# Patient Record
Sex: MALE | Race: WHITE | Employment: OTHER | URBAN - METROPOLITAN AREA
[De-identification: names, ages, dates, MRNs, and addresses within clinical notes are randomized per-mention and may not be internally consistent; named-entity substitution may affect disease eponyms.]

---

## 2018-03-26 ENCOUNTER — HOSPITAL ENCOUNTER (OUTPATIENT)
Dept: PREADMISSION TESTING | Age: 48
Discharge: HOME | End: 2018-03-30
Payer: MEDICARE

## 2018-03-26 VITALS
HEART RATE: 91 BPM | BODY MASS INDEX: 28.98 KG/M2 | HEIGHT: 68 IN | OXYGEN SATURATION: 96 % | WEIGHT: 191.2 LBS | TEMPERATURE: 97.6 F | SYSTOLIC BLOOD PRESSURE: 90 MMHG | DIASTOLIC BLOOD PRESSURE: 63 MMHG | RESPIRATION RATE: 16 BRPM

## 2018-03-26 DIAGNOSIS — G56.01 CARPAL TUNNEL SYNDROME OF RIGHT WRIST: ICD-10-CM

## 2018-03-26 LAB
ANION GAP SERPL CALCULATED.3IONS-SCNC: 15 MEQ/L (ref 7–13)
BUN BLDV-MCNC: 11 MG/DL (ref 6–20)
CALCIUM SERPL-MCNC: 9 MG/DL (ref 8.6–10.2)
CHLORIDE BLD-SCNC: 101 MEQ/L (ref 98–107)
CO2: 25 MEQ/L (ref 22–29)
CREAT SERPL-MCNC: 0.81 MG/DL (ref 0.7–1.2)
EKG ATRIAL RATE: 67 BPM
EKG P AXIS: 67 DEGREES
EKG P-R INTERVAL: 162 MS
EKG Q-T INTERVAL: 380 MS
EKG QRS DURATION: 84 MS
EKG QTC CALCULATION (BAZETT): 401 MS
EKG R AXIS: 47 DEGREES
EKG T AXIS: 29 DEGREES
EKG VENTRICULAR RATE: 67 BPM
GFR AFRICAN AMERICAN: >60
GFR NON-AFRICAN AMERICAN: >60
GLUCOSE BLD-MCNC: 98 MG/DL (ref 74–109)
HCT VFR BLD CALC: 39.2 % (ref 42–52)
HEMOGLOBIN: 14.3 G/DL (ref 14–18)
MCH RBC QN AUTO: 31.1 PG (ref 27–31.3)
MCHC RBC AUTO-ENTMCNC: 36.4 % (ref 33–37)
MCV RBC AUTO: 85.5 FL (ref 80–100)
PDW BLD-RTO: 13.8 % (ref 11.5–14.5)
PLATELET # BLD: 219 K/UL (ref 130–400)
POTASSIUM SERPL-SCNC: 4.1 MEQ/L (ref 3.5–5.1)
RBC # BLD: 4.59 M/UL (ref 4.7–6.1)
SODIUM BLD-SCNC: 141 MEQ/L (ref 132–144)
WBC # BLD: 8.4 K/UL (ref 4.8–10.8)

## 2018-03-26 PROCEDURE — 80048 BASIC METABOLIC PNL TOTAL CA: CPT

## 2018-03-26 PROCEDURE — 93005 ELECTROCARDIOGRAM TRACING: CPT

## 2018-03-26 PROCEDURE — 85027 COMPLETE CBC AUTOMATED: CPT

## 2018-03-26 RX ORDER — SODIUM CHLORIDE 0.9 % (FLUSH) 0.9 %
10 SYRINGE (ML) INJECTION PRN
Status: CANCELLED | OUTPATIENT
Start: 2018-03-26

## 2018-03-26 RX ORDER — SODIUM CHLORIDE, SODIUM LACTATE, POTASSIUM CHLORIDE, CALCIUM CHLORIDE 600; 310; 30; 20 MG/100ML; MG/100ML; MG/100ML; MG/100ML
INJECTION, SOLUTION INTRAVENOUS CONTINUOUS
Status: CANCELLED | OUTPATIENT
Start: 2018-03-26

## 2018-03-26 RX ORDER — LIDOCAINE HYDROCHLORIDE 10 MG/ML
1 INJECTION, SOLUTION EPIDURAL; INFILTRATION; INTRACAUDAL; PERINEURAL
Status: CANCELLED | OUTPATIENT
Start: 2018-03-26 | End: 2018-03-26

## 2018-03-26 RX ORDER — SODIUM CHLORIDE 0.9 % (FLUSH) 0.9 %
10 SYRINGE (ML) INJECTION EVERY 12 HOURS SCHEDULED
Status: CANCELLED | OUTPATIENT
Start: 2018-03-26

## 2018-03-27 PROCEDURE — 93010 ELECTROCARDIOGRAM REPORT: CPT | Performed by: INTERNAL MEDICINE

## 2018-04-02 ENCOUNTER — ANESTHESIA EVENT (OUTPATIENT)
Dept: OPERATING ROOM | Age: 48
End: 2018-04-02
Payer: MEDICARE

## 2018-04-02 ENCOUNTER — ANESTHESIA (OUTPATIENT)
Dept: OPERATING ROOM | Age: 48
End: 2018-04-02
Payer: MEDICARE

## 2018-04-02 ENCOUNTER — HOSPITAL ENCOUNTER (OUTPATIENT)
Age: 48
Setting detail: OUTPATIENT SURGERY
Discharge: HOME | End: 2018-04-02
Attending: ORTHOPAEDIC SURGERY | Admitting: ORTHOPAEDIC SURGERY
Payer: MEDICARE

## 2018-04-02 VITALS
HEART RATE: 66 BPM | HEIGHT: 68 IN | WEIGHT: 191 LBS | TEMPERATURE: 97.5 F | RESPIRATION RATE: 16 BRPM | BODY MASS INDEX: 28.95 KG/M2 | OXYGEN SATURATION: 96 % | SYSTOLIC BLOOD PRESSURE: 146 MMHG | DIASTOLIC BLOOD PRESSURE: 74 MMHG

## 2018-04-02 VITALS — DIASTOLIC BLOOD PRESSURE: 57 MMHG | SYSTOLIC BLOOD PRESSURE: 108 MMHG | OXYGEN SATURATION: 100 % | TEMPERATURE: 95.2 F

## 2018-04-02 DIAGNOSIS — G56.01 CARPAL TUNNEL SYNDROME OF RIGHT WRIST: Primary | ICD-10-CM

## 2018-04-02 LAB
GLUCOSE BLD-MCNC: 135 MG/DL
GLUCOSE BLD-MCNC: 135 MG/DL (ref 60–115)
PERFORMED ON: ABNORMAL

## 2018-04-02 PROCEDURE — 2580000003 HC RX 258: Performed by: NURSE PRACTITIONER

## 2018-04-02 PROCEDURE — 2580000003 HC RX 258: Performed by: NURSE ANESTHETIST, CERTIFIED REGISTERED

## 2018-04-02 PROCEDURE — 3600000004 HC SURGERY LEVEL 4 BASE: Performed by: ORTHOPAEDIC SURGERY

## 2018-04-02 PROCEDURE — 2500000003 HC RX 250 WO HCPCS: Performed by: NURSE ANESTHETIST, CERTIFIED REGISTERED

## 2018-04-02 PROCEDURE — 6360000002 HC RX W HCPCS: Performed by: ORTHOPAEDIC SURGERY

## 2018-04-02 PROCEDURE — 6360000002 HC RX W HCPCS: Performed by: NURSE ANESTHETIST, CERTIFIED REGISTERED

## 2018-04-02 PROCEDURE — 3700000000 HC ANESTHESIA ATTENDED CARE: Performed by: ORTHOPAEDIC SURGERY

## 2018-04-02 PROCEDURE — 2580000003 HC RX 258: Performed by: ORTHOPAEDIC SURGERY

## 2018-04-02 PROCEDURE — 2500000003 HC RX 250 WO HCPCS: Performed by: ORTHOPAEDIC SURGERY

## 2018-04-02 PROCEDURE — 3600000014 HC SURGERY LEVEL 4 ADDTL 15MIN: Performed by: ORTHOPAEDIC SURGERY

## 2018-04-02 PROCEDURE — 3700000001 HC ADD 15 MINUTES (ANESTHESIA): Performed by: ORTHOPAEDIC SURGERY

## 2018-04-02 PROCEDURE — 7100000010 HC PHASE II RECOVERY - FIRST 15 MIN: Performed by: ORTHOPAEDIC SURGERY

## 2018-04-02 PROCEDURE — 7100000011 HC PHASE II RECOVERY - ADDTL 15 MIN: Performed by: ORTHOPAEDIC SURGERY

## 2018-04-02 PROCEDURE — 2500000003 HC RX 250 WO HCPCS: Performed by: NURSE PRACTITIONER

## 2018-04-02 RX ORDER — SODIUM CHLORIDE 0.9 % (FLUSH) 0.9 %
10 SYRINGE (ML) INJECTION PRN
Status: DISCONTINUED | OUTPATIENT
Start: 2018-04-02 | End: 2018-04-02 | Stop reason: HOSPADM

## 2018-04-02 RX ORDER — MEPERIDINE HYDROCHLORIDE 25 MG/ML
12.5 INJECTION INTRAMUSCULAR; INTRAVENOUS; SUBCUTANEOUS EVERY 5 MIN PRN
Status: DISCONTINUED | OUTPATIENT
Start: 2018-04-02 | End: 2018-04-02 | Stop reason: HOSPADM

## 2018-04-02 RX ORDER — HYDROCODONE BITARTRATE AND ACETAMINOPHEN 5; 325 MG/1; MG/1
1 TABLET ORAL EVERY 6 HOURS PRN
Qty: 15 TABLET | Refills: 0 | Status: SHIPPED | OUTPATIENT
Start: 2018-04-02 | End: 2018-04-07

## 2018-04-02 RX ORDER — SODIUM CHLORIDE 0.9 % (FLUSH) 0.9 %
10 SYRINGE (ML) INJECTION EVERY 12 HOURS SCHEDULED
Status: DISCONTINUED | OUTPATIENT
Start: 2018-04-02 | End: 2018-04-02 | Stop reason: HOSPADM

## 2018-04-02 RX ORDER — LIDOCAINE HYDROCHLORIDE 10 MG/ML
1 INJECTION, SOLUTION EPIDURAL; INFILTRATION; INTRACAUDAL; PERINEURAL
Status: COMPLETED | OUTPATIENT
Start: 2018-04-02 | End: 2018-04-02

## 2018-04-02 RX ORDER — LIDOCAINE HYDROCHLORIDE 10 MG/ML
INJECTION, SOLUTION EPIDURAL; INFILTRATION; INTRACAUDAL; PERINEURAL PRN
Status: DISCONTINUED | OUTPATIENT
Start: 2018-04-02 | End: 2018-04-02 | Stop reason: HOSPADM

## 2018-04-02 RX ORDER — HYDROCODONE BITARTRATE AND ACETAMINOPHEN 5; 325 MG/1; MG/1
1 TABLET ORAL PRN
Status: DISCONTINUED | OUTPATIENT
Start: 2018-04-02 | End: 2018-04-02 | Stop reason: HOSPADM

## 2018-04-02 RX ORDER — SODIUM CHLORIDE, SODIUM LACTATE, POTASSIUM CHLORIDE, CALCIUM CHLORIDE 600; 310; 30; 20 MG/100ML; MG/100ML; MG/100ML; MG/100ML
INJECTION, SOLUTION INTRAVENOUS CONTINUOUS PRN
Status: DISCONTINUED | OUTPATIENT
Start: 2018-04-02 | End: 2018-04-02 | Stop reason: SDUPTHER

## 2018-04-02 RX ORDER — LIDOCAINE HYDROCHLORIDE 20 MG/ML
INJECTION, SOLUTION INFILTRATION; PERINEURAL PRN
Status: DISCONTINUED | OUTPATIENT
Start: 2018-04-02 | End: 2018-04-02 | Stop reason: SDUPTHER

## 2018-04-02 RX ORDER — ONDANSETRON 2 MG/ML
4 INJECTION INTRAMUSCULAR; INTRAVENOUS
Status: DISCONTINUED | OUTPATIENT
Start: 2018-04-02 | End: 2018-04-02 | Stop reason: HOSPADM

## 2018-04-02 RX ORDER — MAGNESIUM HYDROXIDE 1200 MG/15ML
LIQUID ORAL CONTINUOUS PRN
Status: DISCONTINUED | OUTPATIENT
Start: 2018-04-02 | End: 2018-04-02 | Stop reason: HOSPADM

## 2018-04-02 RX ORDER — PROPOFOL 10 MG/ML
INJECTION, EMULSION INTRAVENOUS CONTINUOUS PRN
Status: DISCONTINUED | OUTPATIENT
Start: 2018-04-02 | End: 2018-04-02 | Stop reason: SDUPTHER

## 2018-04-02 RX ORDER — HYDROCODONE BITARTRATE AND ACETAMINOPHEN 5; 325 MG/1; MG/1
2 TABLET ORAL PRN
Status: DISCONTINUED | OUTPATIENT
Start: 2018-04-02 | End: 2018-04-02 | Stop reason: HOSPADM

## 2018-04-02 RX ORDER — PROPOFOL 10 MG/ML
INJECTION, EMULSION INTRAVENOUS PRN
Status: DISCONTINUED | OUTPATIENT
Start: 2018-04-02 | End: 2018-04-02 | Stop reason: SDUPTHER

## 2018-04-02 RX ORDER — FENTANYL CITRATE 50 UG/ML
50 INJECTION, SOLUTION INTRAMUSCULAR; INTRAVENOUS EVERY 10 MIN PRN
Status: DISCONTINUED | OUTPATIENT
Start: 2018-04-02 | End: 2018-04-02 | Stop reason: HOSPADM

## 2018-04-02 RX ORDER — DIPHENHYDRAMINE HYDROCHLORIDE 50 MG/ML
12.5 INJECTION INTRAMUSCULAR; INTRAVENOUS
Status: DISCONTINUED | OUTPATIENT
Start: 2018-04-02 | End: 2018-04-02 | Stop reason: HOSPADM

## 2018-04-02 RX ORDER — MIDAZOLAM HYDROCHLORIDE 1 MG/ML
INJECTION INTRAMUSCULAR; INTRAVENOUS PRN
Status: DISCONTINUED | OUTPATIENT
Start: 2018-04-02 | End: 2018-04-02 | Stop reason: SDUPTHER

## 2018-04-02 RX ORDER — SODIUM CHLORIDE, SODIUM LACTATE, POTASSIUM CHLORIDE, CALCIUM CHLORIDE 600; 310; 30; 20 MG/100ML; MG/100ML; MG/100ML; MG/100ML
INJECTION, SOLUTION INTRAVENOUS CONTINUOUS
Status: DISCONTINUED | OUTPATIENT
Start: 2018-04-02 | End: 2018-04-02 | Stop reason: HOSPADM

## 2018-04-02 RX ORDER — METOCLOPRAMIDE HYDROCHLORIDE 5 MG/ML
10 INJECTION INTRAMUSCULAR; INTRAVENOUS
Status: DISCONTINUED | OUTPATIENT
Start: 2018-04-02 | End: 2018-04-02 | Stop reason: HOSPADM

## 2018-04-02 RX ADMIN — CEFAZOLIN SODIUM 2 G: 2 SOLUTION INTRAVENOUS at 07:30

## 2018-04-02 RX ADMIN — SODIUM CHLORIDE, POTASSIUM CHLORIDE, SODIUM LACTATE AND CALCIUM CHLORIDE: 600; 310; 30; 20 INJECTION, SOLUTION INTRAVENOUS at 07:20

## 2018-04-02 RX ADMIN — MIDAZOLAM HYDROCHLORIDE 1 MG: 1 INJECTION, SOLUTION INTRAMUSCULAR; INTRAVENOUS at 07:43

## 2018-04-02 RX ADMIN — MIDAZOLAM HYDROCHLORIDE 1 MG: 1 INJECTION, SOLUTION INTRAMUSCULAR; INTRAVENOUS at 07:35

## 2018-04-02 RX ADMIN — PROPOFOL 30 MG: 10 INJECTION, EMULSION INTRAVENOUS at 07:43

## 2018-04-02 RX ADMIN — PROPOFOL 30 MG: 10 INJECTION, EMULSION INTRAVENOUS at 07:35

## 2018-04-02 RX ADMIN — PROPOFOL 30 MG: 10 INJECTION, EMULSION INTRAVENOUS at 07:37

## 2018-04-02 RX ADMIN — SODIUM CHLORIDE, POTASSIUM CHLORIDE, SODIUM LACTATE AND CALCIUM CHLORIDE 1000 ML: 600; 310; 30; 20 INJECTION, SOLUTION INTRAVENOUS at 06:30

## 2018-04-02 RX ADMIN — PROPOFOL 75 MCG/KG/MIN: 10 INJECTION, EMULSION INTRAVENOUS at 07:35

## 2018-04-02 RX ADMIN — MIDAZOLAM HYDROCHLORIDE 2 MG: 1 INJECTION, SOLUTION INTRAMUSCULAR; INTRAVENOUS at 07:27

## 2018-04-02 RX ADMIN — LIDOCAINE HYDROCHLORIDE 0.1 ML: 10 INJECTION, SOLUTION EPIDURAL; INFILTRATION; INTRACAUDAL; PERINEURAL at 06:30

## 2018-04-02 RX ADMIN — LIDOCAINE HYDROCHLORIDE 100 MG: 20 INJECTION, SOLUTION INFILTRATION; PERINEURAL at 07:35

## 2018-04-02 ASSESSMENT — PULMONARY FUNCTION TESTS
PIF_VALUE: 0
PIF_VALUE: 1
PIF_VALUE: 1
PIF_VALUE: 0
PIF_VALUE: 1
PIF_VALUE: 0
PIF_VALUE: 1
PIF_VALUE: 0
PIF_VALUE: 0
PIF_VALUE: 1
PIF_VALUE: 1
PIF_VALUE: 0
PIF_VALUE: 0
PIF_VALUE: 1
PIF_VALUE: 0
PIF_VALUE: 1
PIF_VALUE: 0
PIF_VALUE: 0
PIF_VALUE: 1
PIF_VALUE: 0
PIF_VALUE: 1
PIF_VALUE: 1

## 2018-04-02 ASSESSMENT — PAIN - FUNCTIONAL ASSESSMENT: PAIN_FUNCTIONAL_ASSESSMENT: 0-10

## 2018-04-02 ASSESSMENT — PAIN SCALES - GENERAL: PAINLEVEL_OUTOF10: 0

## 2018-04-02 ASSESSMENT — PAIN DESCRIPTION - LOCATION: LOCATION: HAND

## 2018-04-02 ASSESSMENT — LIFESTYLE VARIABLES: SMOKING_STATUS: 1

## 2018-04-02 ASSESSMENT — PAIN DESCRIPTION - ORIENTATION: ORIENTATION: RIGHT

## 2020-11-11 ENCOUNTER — HOSPITAL ENCOUNTER (OUTPATIENT)
Dept: MRI IMAGING | Age: 50
Discharge: HOME | End: 2020-11-13
Payer: MEDICARE

## 2020-11-11 PROCEDURE — 72141 MRI NECK SPINE W/O DYE: CPT

## 2023-01-24 PROBLEM — K61.1 PERIRECTAL ABSCESS: Status: ACTIVE | Noted: 2023-01-24

## 2023-01-24 PROBLEM — K59.00 CONSTIPATION: Status: ACTIVE | Noted: 2023-01-24

## 2023-01-24 PROBLEM — F41.9 ANXIETY: Status: ACTIVE | Noted: 2023-01-24

## 2023-01-24 PROBLEM — K21.9 GERD (GASTROESOPHAGEAL REFLUX DISEASE): Status: ACTIVE | Noted: 2023-01-24

## 2023-01-24 PROBLEM — K61.0 ABSCESS, PERIANAL: Status: ACTIVE | Noted: 2023-01-24

## 2023-01-24 PROBLEM — I10 HTN (HYPERTENSION): Status: ACTIVE | Noted: 2023-01-24

## 2023-01-24 PROBLEM — M50.90 CERVICAL DISC DISEASE: Status: ACTIVE | Noted: 2023-01-24

## 2023-01-24 PROBLEM — R20.2 PARESTHESIA OF UPPER EXTREMITY: Status: ACTIVE | Noted: 2023-01-24

## 2023-01-24 PROBLEM — J20.9 ACUTE BRONCHITIS: Status: ACTIVE | Noted: 2023-01-24

## 2023-01-24 PROBLEM — M48.02 DEGENERATIVE CERVICAL SPINAL STENOSIS: Status: ACTIVE | Noted: 2023-01-24

## 2023-01-24 PROBLEM — G89.29 CHRONIC PAIN OF BOTH SHOULDERS: Status: ACTIVE | Noted: 2023-01-24

## 2023-01-24 PROBLEM — H10.32 ACUTE BACTERIAL CONJUNCTIVITIS OF LEFT EYE: Status: ACTIVE | Noted: 2023-01-24

## 2023-01-24 PROBLEM — M48.02 NEURAL FORAMINAL STENOSIS OF CERVICAL SPINE: Status: ACTIVE | Noted: 2023-01-24

## 2023-01-24 PROBLEM — M79.10 MYALGIA: Status: ACTIVE | Noted: 2023-01-24

## 2023-01-24 PROBLEM — M54.2 NECK PAIN: Status: ACTIVE | Noted: 2023-01-24

## 2023-01-24 PROBLEM — E11.40 DIABETIC NEUROPATHY (MULTI): Status: ACTIVE | Noted: 2023-01-24

## 2023-01-24 PROBLEM — R53.83 FATIGUE: Status: ACTIVE | Noted: 2023-01-24

## 2023-01-24 PROBLEM — K62.89 RECTAL PAIN: Status: ACTIVE | Noted: 2023-01-24

## 2023-01-24 PROBLEM — M25.512 LEFT SHOULDER PAIN: Status: ACTIVE | Noted: 2023-01-24

## 2023-01-24 PROBLEM — R82.2 BILIRUBINURIA: Status: ACTIVE | Noted: 2023-01-24

## 2023-01-24 PROBLEM — N52.9 ERECTILE DYSFUNCTION: Status: ACTIVE | Noted: 2023-01-24

## 2023-01-24 PROBLEM — G56.00 CARPAL TUNNEL SYNDROME: Status: ACTIVE | Noted: 2023-01-24

## 2023-01-24 PROBLEM — R41.3 MEMORY DEFICITS: Status: ACTIVE | Noted: 2023-01-24

## 2023-01-24 PROBLEM — E55.9 VITAMIN D DEFICIENCY: Status: ACTIVE | Noted: 2023-01-24

## 2023-01-24 PROBLEM — K80.20 GALLSTONES: Status: ACTIVE | Noted: 2023-01-24

## 2023-01-24 PROBLEM — R10.13 ABDOMINAL PAIN, EPIGASTRIC: Status: ACTIVE | Noted: 2023-01-24

## 2023-01-24 PROBLEM — M54.12 CERVICAL RADICULOPATHY, CHRONIC: Status: ACTIVE | Noted: 2023-01-24

## 2023-01-24 PROBLEM — L02.416 ABSCESS OF KNEE, LEFT: Status: ACTIVE | Noted: 2023-01-24

## 2023-01-24 PROBLEM — L03.119 CELLULITIS OF KNEE: Status: ACTIVE | Noted: 2023-01-24

## 2023-01-24 PROBLEM — C62.90 MALIGNANT NEOPLASM OF TESTIS (MULTI): Status: ACTIVE | Noted: 2023-01-24

## 2023-01-24 PROBLEM — H40.059 INCREASED INTRAOCULAR PRESSURE: Status: ACTIVE | Noted: 2023-01-24

## 2023-01-24 PROBLEM — D14.1 BENIGN NEOPLASM OF LARYNX: Status: ACTIVE | Noted: 2023-01-24

## 2023-01-24 PROBLEM — R10.9 ABDOMINAL PAIN OF MULTIPLE SITES: Status: ACTIVE | Noted: 2023-01-24

## 2023-01-24 PROBLEM — R42 VERTIGO: Status: ACTIVE | Noted: 2023-01-24

## 2023-01-24 PROBLEM — R29.898 ARM WEAKNESS: Status: ACTIVE | Noted: 2023-01-24

## 2023-01-24 PROBLEM — R10.11 RIGHT UPPER QUADRANT ABDOMINAL PAIN: Status: ACTIVE | Noted: 2023-01-24

## 2023-01-24 PROBLEM — E66.3 OVERWEIGHT: Status: ACTIVE | Noted: 2023-01-24

## 2023-01-24 PROBLEM — M25.512 CHRONIC PAIN OF BOTH SHOULDERS: Status: ACTIVE | Noted: 2023-01-24

## 2023-01-24 PROBLEM — R20.0 NUMBNESS: Status: ACTIVE | Noted: 2023-01-24

## 2023-01-24 PROBLEM — K92.1 BLOOD IN STOOL: Status: ACTIVE | Noted: 2023-01-24

## 2023-01-24 PROBLEM — E78.5 HYPERLIPIDEMIA: Status: ACTIVE | Noted: 2023-01-24

## 2023-01-24 PROBLEM — R11.2 NAUSEA AND VOMITING: Status: ACTIVE | Noted: 2023-01-24

## 2023-01-24 PROBLEM — K62.5 RECTAL BLEEDING: Status: ACTIVE | Noted: 2023-01-24

## 2023-01-24 PROBLEM — M25.511 CHRONIC PAIN OF BOTH SHOULDERS: Status: ACTIVE | Noted: 2023-01-24

## 2023-01-24 PROBLEM — E78.1 HYPERTRIGLYCERIDEMIA: Status: ACTIVE | Noted: 2023-01-24

## 2023-01-24 PROBLEM — E11.9 TYPE 2 DIABETES MELLITUS (MULTI): Status: ACTIVE | Noted: 2023-01-24

## 2023-01-24 PROBLEM — E29.1 HYPOGONADISM IN MALE: Status: ACTIVE | Noted: 2023-01-24

## 2023-01-24 PROBLEM — E53.8 VITAMIN B 12 DEFICIENCY: Status: ACTIVE | Noted: 2023-01-24

## 2023-01-24 PROBLEM — F17.210 CIGARETTE SMOKER: Status: ACTIVE | Noted: 2023-01-24

## 2023-01-24 RX ORDER — TADALAFIL 5 MG/1
1 TABLET ORAL DAILY
COMMUNITY
Start: 2021-05-17 | End: 2024-02-19 | Stop reason: SDUPTHER

## 2023-01-24 RX ORDER — OMEPRAZOLE 40 MG/1
1 CAPSULE, DELAYED RELEASE ORAL DAILY
COMMUNITY
Start: 2021-05-17 | End: 2024-02-12 | Stop reason: SDUPTHER

## 2023-01-24 RX ORDER — FAMOTIDINE 20 MG/1
1 TABLET, FILM COATED ORAL 2 TIMES DAILY PRN
COMMUNITY
Start: 2021-05-17 | End: 2024-02-12 | Stop reason: SDUPTHER

## 2023-01-24 RX ORDER — SYRINGE WITH NEEDLE, 1 ML 25GX5/8"
SYRINGE, EMPTY DISPOSABLE MISCELLANEOUS SEE ADMIN INSTRUCTIONS
COMMUNITY

## 2023-01-24 RX ORDER — ONDANSETRON 4 MG/1
1 TABLET, ORALLY DISINTEGRATING ORAL SEE ADMIN INSTRUCTIONS
COMMUNITY
Start: 2017-06-07

## 2023-01-24 RX ORDER — CYANOCOBALAMIN 1000 UG/ML
1 INJECTION, SOLUTION INTRAMUSCULAR; SUBCUTANEOUS SEE ADMIN INSTRUCTIONS
COMMUNITY
Start: 2022-09-08

## 2023-01-24 RX ORDER — ASPIRIN 325 MG
1 TABLET, DELAYED RELEASE (ENTERIC COATED) ORAL SEE ADMIN INSTRUCTIONS
COMMUNITY
Start: 2021-09-23

## 2023-03-09 ENCOUNTER — OFFICE VISIT (OUTPATIENT)
Dept: PRIMARY CARE | Facility: CLINIC | Age: 53
End: 2023-03-09
Payer: MEDICARE

## 2023-03-09 VITALS
HEART RATE: 69 BPM | DIASTOLIC BLOOD PRESSURE: 75 MMHG | OXYGEN SATURATION: 97 % | BODY MASS INDEX: 29.8 KG/M2 | SYSTOLIC BLOOD PRESSURE: 126 MMHG | HEIGHT: 68 IN | WEIGHT: 196.6 LBS

## 2023-03-09 DIAGNOSIS — Z12.11 SCREEN FOR COLON CANCER: ICD-10-CM

## 2023-03-09 DIAGNOSIS — F17.210 CIGARETTE NICOTINE DEPENDENCE WITHOUT COMPLICATION: ICD-10-CM

## 2023-03-09 DIAGNOSIS — G89.29 CHRONIC LOW BACK PAIN, UNSPECIFIED BACK PAIN LATERALITY, UNSPECIFIED WHETHER SCIATICA PRESENT: ICD-10-CM

## 2023-03-09 DIAGNOSIS — M54.50 CHRONIC LOW BACK PAIN, UNSPECIFIED BACK PAIN LATERALITY, UNSPECIFIED WHETHER SCIATICA PRESENT: ICD-10-CM

## 2023-03-09 DIAGNOSIS — R06.02 SOB (SHORTNESS OF BREATH): ICD-10-CM

## 2023-03-09 DIAGNOSIS — R06.2 WHEEZING: ICD-10-CM

## 2023-03-09 DIAGNOSIS — Z00.00 PREVENTATIVE HEALTH CARE: Primary | ICD-10-CM

## 2023-03-09 DIAGNOSIS — K62.5 RECTAL BLEEDING: ICD-10-CM

## 2023-03-09 PROCEDURE — 3078F DIAST BP <80 MM HG: CPT | Performed by: FAMILY MEDICINE

## 2023-03-09 PROCEDURE — 3074F SYST BP LT 130 MM HG: CPT | Performed by: FAMILY MEDICINE

## 2023-03-09 PROCEDURE — 99215 OFFICE O/P EST HI 40 MIN: CPT | Performed by: FAMILY MEDICINE

## 2023-03-09 PROCEDURE — 99396 PREV VISIT EST AGE 40-64: CPT | Performed by: FAMILY MEDICINE

## 2023-03-09 RX ORDER — ALBUTEROL SULFATE 90 UG/1
2 AEROSOL, METERED RESPIRATORY (INHALATION) EVERY 4 HOURS PRN
Qty: 8 G | Refills: 5 | Status: SHIPPED | OUTPATIENT
Start: 2023-03-09 | End: 2024-03-08

## 2023-03-09 RX ORDER — CELECOXIB 100 MG/1
CAPSULE ORAL
Qty: 60 CAPSULE | Refills: 1 | Status: SHIPPED | OUTPATIENT
Start: 2023-03-09 | End: 2023-06-09

## 2023-03-09 RX ORDER — CYANOCOBALAMIN 1000 UG/ML
1000 INJECTION, SOLUTION INTRAMUSCULAR; SUBCUTANEOUS ONCE
Status: CANCELLED | OUTPATIENT
Start: 2023-03-09 | End: 2023-03-09

## 2023-03-09 RX ORDER — VARENICLINE TARTRATE 1 MG/1
1 TABLET, FILM COATED ORAL 2 TIMES DAILY
Qty: 60 TABLET | Refills: 11 | Status: SHIPPED | OUTPATIENT
Start: 2023-03-09 | End: 2024-02-19 | Stop reason: ALTCHOICE

## 2023-03-09 NOTE — PROGRESS NOTES
Pt in today for routine FU. / C/o back pain. Also preventative.    Denies N/V/D/C, no HA/S/V, denies rashes/lesions, no CP/SOB. Denies fevers/chills.  All other systems were negative.    Gen: NAD  eyes: EOMI, PERRLA  ENT: hearing grossly intact, no nasal discharge  resp: CTABL, without R/R  heart: RRR without MRG  GI: abd: S/ND/NT, BS+  lymph: no axillary, cervical, supraclavicular lymphadenopathy noted   MS: gait grossly WNL,  derm: no rashes or lesions noted  neuro: CN II-XII grossly intact  psych: A&Ox3    Adding B12 injection every 2 weeks.      Preventative visit and [facility] [place] due next in 2023.  ---------  Screening for colon cancer. -->> referring to gi    Screen for hepatitis C was negative September 2022.    Screening for prostate cancer.  PSA 0.97, last time was 1.0.  Patient does have family history of prostate cancer. -->> We will plan to check PSA annually.    Patient is due for annual flu shot.  Is up to date on coronavirus immunization, due for bivalent booster.  Is also due for shingles immunizations.  Had Tdap immunization in September 2021. -->>  Check with your pharmacy about getting up-to-date on your other immunizations.  ------  Cardiac CT calcium scoring was zero, over read was also negative.    Overweight, bmi 29.  Up 6 since last appt here. Down a lot from a max of 390. -->>  Keep up the excellent overall work.  Continue to avoid simple carbohydrates like bread, pasta, potatoes, rice, sugars etc.    Regarding previous labs:  - Type 2 diabetes, with diabetic neuropathy, A1c 7.3, was 7.2, 6.5, 6.5, 6.4, 6.0, 6.2, 6.5, 6.5, 6.3, 6.9 (oldest). H/O A1c's in the 14s. Had very severe xanthomatosis this was around the year 2000. Put on diabetic meds on 2001, put on weight until approximately doubling his weight maxing out at approximately 393. Around 2014 stopped taking his diabetic meds, has been losing weight since then.  Denies family history of thyroid cancers.  Was started on Ozempic at last  appointment.  Has taken it 3 times and every time had severe abdominal pain, similar to previous pancreatitis episodes. -->>  We will discontinue Ozempic.  We will try the oral version which is Rybelsus.  We will just start on the low-dose.  We will just keep on the 3 mg for the next 3 months.  - Vitamin D deficiency, 10, was 9, 8, 11,  20, 10, 9, 7, 16. Your goal is 75 - 100 or more.  Take about 50,000 units monthly. -->>  Try to increase to 4 pills a month and will recheck in 3 months or so.    -Hyperlipidemia, HDL 30, was 32, 36, 35, goal 45 or more. LDL 96, was N/C, 116, 93, goal is 70 or less. , was 477, was 168, 740. . Historically as high as 13,000, averaging 5000s. Patient was taking Crestor 5 mg at that time, is not anymore due to gastrointestinal side effects.  Has failed gemfibrozil also due to GI side effects-->> we had discussed possibly restarting Crestor, but on second thought with his triglyceride history we should probably be able to work on getting one of the newer injectables.  We will recheck lipids in 3 months and discuss again then.  - Drug therapy, screening for conditions.  -->>  Recheck annual labs around September 2023.  - Hypogonadism, elevated estrogens, DHEA sulfate is borderline low. Testosterone is probably not optiaml, this is less than optimal.    b12 deficiency, is noticing some extra energy with the B12 shots. -->>  Can continue B12 injections every week or 2.      Low back pain.  Been bad for the last month or so.  Has tried Aleve over-the-counter, helping little. -->>  We will prescribe Celebrex and refer to orthopedics for evaluation and treatment.    Shortness of breath with wheezing.  Has had this for several months, worsening over time. -->> Prescribed albuterol inhaler and refer to pulmonary medicine for evaluation and treatment.    Smoking about 2 packs/day.  Is feeling like he is really ready to quit.  Has had success with Chantix in the past. -->>  We will prescribe  Chantix.    -We will draw labs today that were previously ordered, that would be lipid panel, vitamin D, A1c.    - We will administer B12 injection today and can schedule patient every 2 weeks for B12 injections.  -We will schedule virtual appointment in about a week to go over labs.

## 2023-03-16 ENCOUNTER — TELEPHONE (OUTPATIENT)
Dept: PRIMARY CARE | Facility: CLINIC | Age: 53
End: 2023-03-16

## 2023-03-17 ENCOUNTER — TELEPHONE (OUTPATIENT)
Dept: PRIMARY CARE | Facility: CLINIC | Age: 53
End: 2023-03-17
Payer: MEDICARE

## 2023-03-20 NOTE — TELEPHONE ENCOUNTER
Please contact [facility] to ask them to send us his lab results.  Thank you.  Unless of course this is already done.  Thank you also.

## 2023-03-23 ENCOUNTER — CLINICAL SUPPORT (OUTPATIENT)
Dept: PRIMARY CARE | Facility: CLINIC | Age: 53
End: 2023-03-23
Payer: MEDICARE

## 2023-03-23 DIAGNOSIS — E53.8 VITAMIN B 12 DEFICIENCY: ICD-10-CM

## 2023-03-23 PROCEDURE — 96372 THER/PROPH/DIAG INJ SC/IM: CPT | Performed by: FAMILY MEDICINE

## 2023-03-23 RX ORDER — CYANOCOBALAMIN 1000 UG/ML
1000 INJECTION, SOLUTION INTRAMUSCULAR; SUBCUTANEOUS ONCE
Status: COMPLETED | OUTPATIENT
Start: 2023-03-23 | End: 2023-03-23

## 2023-03-23 RX ADMIN — CYANOCOBALAMIN 1000 MCG: 1000 INJECTION, SOLUTION INTRAMUSCULAR; SUBCUTANEOUS at 09:09

## 2023-03-24 ENCOUNTER — TELEMEDICINE (OUTPATIENT)
Dept: PRIMARY CARE | Facility: CLINIC | Age: 53
End: 2023-03-24
Payer: MEDICARE

## 2023-03-24 DIAGNOSIS — F41.9 ANXIETY AND DEPRESSION: ICD-10-CM

## 2023-03-24 DIAGNOSIS — E11.42 TYPE 2 DIABETES MELLITUS WITH DIABETIC POLYNEUROPATHY, WITHOUT LONG-TERM CURRENT USE OF INSULIN (MULTI): Primary | ICD-10-CM

## 2023-03-24 DIAGNOSIS — E11.42 DIABETIC POLYNEUROPATHY ASSOCIATED WITH TYPE 2 DIABETES MELLITUS (MULTI): ICD-10-CM

## 2023-03-24 DIAGNOSIS — E78.5 HYPERLIPIDEMIA, UNSPECIFIED HYPERLIPIDEMIA TYPE: ICD-10-CM

## 2023-03-24 DIAGNOSIS — I10 HYPERTENSION, UNSPECIFIED TYPE: ICD-10-CM

## 2023-03-24 DIAGNOSIS — E55.9 VITAMIN D DEFICIENCY: ICD-10-CM

## 2023-03-24 DIAGNOSIS — E78.1 HYPERTRIGLYCERIDEMIA: ICD-10-CM

## 2023-03-24 DIAGNOSIS — F32.A ANXIETY AND DEPRESSION: ICD-10-CM

## 2023-03-24 PROBLEM — J20.9 ACUTE BRONCHITIS: Status: RESOLVED | Noted: 2023-01-24 | Resolved: 2023-03-24

## 2023-03-24 PROCEDURE — 99214 OFFICE O/P EST MOD 30 MIN: CPT | Performed by: FAMILY MEDICINE

## 2023-03-24 RX ORDER — DAPAGLIFLOZIN 5 MG/1
5 TABLET, FILM COATED ORAL DAILY
Qty: 30 TABLET | Refills: 3 | Status: SHIPPED | OUTPATIENT
Start: 2023-03-24 | End: 2023-08-09

## 2023-03-24 RX ORDER — BUSPIRONE HYDROCHLORIDE 10 MG/1
TABLET ORAL
Qty: 60 TABLET | Refills: 1 | Status: SHIPPED | OUTPATIENT
Start: 2023-03-24 | End: 2023-06-09

## 2023-03-24 NOTE — PROGRESS NOTES
Pt being seen virtually for his lab review FU.    Patient Discussion/Summary     New labs reviewed:  - Type 2 diabetes, with diabetic neuropathy, A1c 8.2, was 7.3, 7.2, 6.5, 6.5, 6.4, 6.0, 6.2, 6.5, 6.5, 6.3, 6.9 (oldest). H/O A1c's in the 14s. Had very severe xanthomatosis this was around the year 2000. Put on diabetic meds on 2001, put on weight until approximately doubling his weight maxing out at approximately 393. Around 2014 stopped taking his diabetic meds, has been losing weight since then.  Is now on over Rybelsus but only 3 mg every other day.  Taking it daily causes abdominal pain similar to what he was having with the Ozempic.  The pain is similar to previous pancreatitis episodes. -->>  Continue the Rybelsus every other day.  Once a month try to take it 2 days in a row to see if you are you are tolerating enough of it to be able to go to that higher dose.  - Vitamin D deficiency, 10, was 10, 9, 8, 11, 20, 10, 9, 7, 16. Your goal is 75 - 100 or more.  Is on about 50,000 units once a week.  Has the pills he got from [employer]. -->>  Increase to 2 pills a week and will recheck in 3 months.     -Hyperlipidemia, HDL 26, was 30, 32, 36, 35, goal 45 or more. LDL N/C, was 96, N/C, 116, 93, goal is 70 or less. , was 341, 477, 168, 740. Historically as high as 13,000, averaging 5000s. Patient was taking Crestor 5 mg at that time, is not anymore due to gastrointestinal side effects. Has failed gemfibrozil also due to GI side effects-->> we had discussed possibly restarting Crestor, but on second thought with his triglyceride history we should probably be able to work on getting one of the newer injectables.  Will order the free sample of the Leqvio.  We will work on getting coverage.        Regarding previous labs:  - Drug therapy, screening for conditions. -->> Recheck annual labs around September 2023.  - Hypogonadism, elevated estrogens, DHEA sulfate is borderline low. Testosterone is probably not optiaml,  this is less than optimal.     Depression with anxiety.  Anxiety is the main problem at this time.  Has been reviewing cognitive distortions. -->>  Remember to try meditation/visualization at least 5 minutes every day.  We will prescribe BuSpar trial.  If after 3 weeks or more you would like the dose increased call us and I could send in 90 days of the next higher dose.  If its not working or you have any problems call us and I will instead prescribe a trial of S-Citalopram.        - We will return in about 3 months for lab review.  - Patient will come in about a week before the plan to get fasting lipid panel, vitamin D, A1c.

## 2023-04-17 ENCOUNTER — INITIAL CONSULT (OUTPATIENT)
Dept: PAIN MANAGEMENT | Age: 53
End: 2023-04-17
Payer: MEDICARE

## 2023-04-17 VITALS
OXYGEN SATURATION: 96 % | HEIGHT: 68 IN | TEMPERATURE: 97.4 F | WEIGHT: 199 LBS | DIASTOLIC BLOOD PRESSURE: 68 MMHG | BODY MASS INDEX: 30.16 KG/M2 | HEART RATE: 98 BPM | SYSTOLIC BLOOD PRESSURE: 112 MMHG

## 2023-04-17 DIAGNOSIS — M16.12 UNILATERAL PRIMARY OSTEOARTHRITIS, LEFT HIP: Primary | ICD-10-CM

## 2023-04-17 DIAGNOSIS — M46.1 BILATERAL SACROILIITIS (HCC): ICD-10-CM

## 2023-04-17 PROCEDURE — G8419 CALC BMI OUT NRM PARAM NOF/U: HCPCS | Performed by: PAIN MEDICINE

## 2023-04-17 PROCEDURE — 99203 OFFICE O/P NEW LOW 30 MIN: CPT | Performed by: PAIN MEDICINE

## 2023-04-17 PROCEDURE — 4004F PT TOBACCO SCREEN RCVD TLK: CPT | Performed by: PAIN MEDICINE

## 2023-04-17 PROCEDURE — 3017F COLORECTAL CA SCREEN DOC REV: CPT | Performed by: PAIN MEDICINE

## 2023-04-17 PROCEDURE — G8427 DOCREV CUR MEDS BY ELIG CLIN: HCPCS | Performed by: PAIN MEDICINE

## 2023-04-17 RX ORDER — ALBUTEROL SULFATE 90 UG/1
2 AEROSOL, METERED RESPIRATORY (INHALATION) EVERY 4 HOURS PRN
COMMUNITY
Start: 2023-03-09 | End: 2024-03-08

## 2023-04-17 RX ORDER — CYANOCOBALAMIN 1000 UG/ML
1000 INJECTION, SOLUTION INTRAMUSCULAR; SUBCUTANEOUS SEE ADMIN INSTRUCTIONS
COMMUNITY
Start: 2022-09-08

## 2023-04-17 RX ORDER — ONDANSETRON 4 MG/1
TABLET, ORALLY DISINTEGRATING ORAL
COMMUNITY
Start: 2023-04-14

## 2023-04-17 RX ORDER — CYCLOBENZAPRINE HCL 10 MG
10 TABLET ORAL 2 TIMES DAILY PRN
COMMUNITY
Start: 2023-03-15

## 2023-04-17 RX ORDER — FAMOTIDINE 20 MG/1
20 TABLET, FILM COATED ORAL 2 TIMES DAILY PRN
COMMUNITY
Start: 2021-05-17 | End: 2023-04-17 | Stop reason: ALTCHOICE

## 2023-04-17 RX ORDER — CELECOXIB 100 MG/1
CAPSULE ORAL
COMMUNITY
Start: 2023-03-09

## 2023-04-17 RX ORDER — BUSPIRONE HYDROCHLORIDE 10 MG/1
TABLET ORAL
COMMUNITY
Start: 2023-03-24

## 2023-04-17 RX ORDER — ORAL SEMAGLUTIDE 3 MG/1
TABLET ORAL
COMMUNITY
Start: 2023-03-15

## 2023-04-17 RX ORDER — VARENICLINE TARTRATE 1 MG/1
TABLET, FILM COATED ORAL
COMMUNITY
Start: 2023-03-13

## 2023-04-17 RX ORDER — DAPAGLIFLOZIN 5 MG/1
5 TABLET, FILM COATED ORAL DAILY
COMMUNITY
Start: 2023-03-24 | End: 2023-04-17 | Stop reason: ALTCHOICE

## 2023-04-17 ASSESSMENT — ENCOUNTER SYMPTOMS
GASTROINTESTINAL NEGATIVE: 1
ALLERGIC/IMMUNOLOGIC NEGATIVE: 1
EYES NEGATIVE: 1
RESPIRATORY NEGATIVE: 1

## 2023-04-17 NOTE — PROGRESS NOTES
BMI 30.26 kg/m²   Physical Exam  Vitals and nursing note reviewed. Constitutional:       Appearance: Normal appearance. HENT:      Head: Normocephalic. Right Ear: Ear canal normal.      Left Ear: Ear canal normal.      Nose: Nose normal.      Mouth/Throat:      Mouth: Mucous membranes are moist.   Eyes:      Extraocular Movements: Extraocular movements intact. Conjunctiva/sclera: Conjunctivae normal.      Pupils: Pupils are equal, round, and reactive to light. Cardiovascular:      Rate and Rhythm: Normal rate and regular rhythm. Pulses: Normal pulses. Heart sounds: Normal heart sounds. Pulmonary:      Effort: Pulmonary effort is normal.      Breath sounds: Normal breath sounds. Abdominal:      General: Abdomen is flat. Bowel sounds are normal.      Palpations: Abdomen is soft. Musculoskeletal:         General: Normal range of motion. Cervical back: Normal range of motion and neck supple. Comments: Good gait able to walk on Hips and Knees, mild to moderate pain on Lumbar flexion and Extension, Tender SI joinrts no pain in Facets, Pain on Gainslins and Hip Grinding, Reflexes intact both sides. Skin:     General: Skin is warm. Capillary Refill: Capillary refill takes less than 2 seconds. Neurological:      General: No focal deficit present. Mental Status: He is alert and oriented to person, place, and time. Mental status is at baseline. Psychiatric:         Mood and Affect: Mood normal.         Behavior: Behavior normal.         Thought Content: Thought content normal.         Judgment: Judgment normal.         Plan     Discussed SI Joint Vs Hip joints as source of pain, PT is current past 2 weeks. Plan on SI Joint injections.

## 2023-05-16 ENCOUNTER — OFFICE VISIT (OUTPATIENT)
Dept: PAIN MANAGEMENT | Age: 53
End: 2023-05-16
Payer: MEDICARE

## 2023-05-16 VITALS
SYSTOLIC BLOOD PRESSURE: 116 MMHG | WEIGHT: 192 LBS | DIASTOLIC BLOOD PRESSURE: 62 MMHG | HEIGHT: 68 IN | OXYGEN SATURATION: 99 % | HEART RATE: 77 BPM | TEMPERATURE: 96.8 F | BODY MASS INDEX: 29.1 KG/M2

## 2023-05-16 DIAGNOSIS — M54.12 CERVICAL RADICULOPATHY: ICD-10-CM

## 2023-05-16 DIAGNOSIS — M16.12 UNILATERAL PRIMARY OSTEOARTHRITIS, LEFT HIP: ICD-10-CM

## 2023-05-16 DIAGNOSIS — M46.1 BILATERAL SACROILIITIS (HCC): Primary | ICD-10-CM

## 2023-05-16 DIAGNOSIS — M47.812 CERVICAL SPONDYLOSIS WITHOUT MYELOPATHY: ICD-10-CM

## 2023-05-16 PROCEDURE — 99213 OFFICE O/P EST LOW 20 MIN: CPT | Performed by: PAIN MEDICINE

## 2023-05-16 PROCEDURE — 3017F COLORECTAL CA SCREEN DOC REV: CPT | Performed by: PAIN MEDICINE

## 2023-05-16 PROCEDURE — G8427 DOCREV CUR MEDS BY ELIG CLIN: HCPCS | Performed by: PAIN MEDICINE

## 2023-05-16 PROCEDURE — 4004F PT TOBACCO SCREEN RCVD TLK: CPT | Performed by: PAIN MEDICINE

## 2023-05-16 PROCEDURE — G8417 CALC BMI ABV UP PARAM F/U: HCPCS | Performed by: PAIN MEDICINE

## 2023-05-16 ASSESSMENT — ENCOUNTER SYMPTOMS
RESPIRATORY NEGATIVE: 1
ALLERGIC/IMMUNOLOGIC NEGATIVE: 1
GASTROINTESTINAL NEGATIVE: 1
EYES NEGATIVE: 1

## 2023-05-16 NOTE — PROGRESS NOTES
Negative. Psychiatric/Behavioral: Negative. Bipolar. All other systems reviewed and are negative. Physical Exam     /62 (Site: Left Upper Arm, Position: Sitting)   Pulse 77   Temp 96.8 °F (36 °C)   Ht 5' 8\" (1.727 m)   Wt 192 lb (87.1 kg)   SpO2 99%   BMI 29.19 kg/m²   Physical Exam  Vitals and nursing note reviewed. Constitutional:       Appearance: Normal appearance. HENT:      Head: Normocephalic. Right Ear: Ear canal normal.      Left Ear: Ear canal normal.      Nose: Nose normal.      Mouth/Throat:      Mouth: Mucous membranes are moist.   Eyes:      Extraocular Movements: Extraocular movements intact. Conjunctiva/sclera: Conjunctivae normal.      Pupils: Pupils are equal, round, and reactive to light. Cardiovascular:      Rate and Rhythm: Normal rate and regular rhythm. Pulses: Normal pulses. Heart sounds: Normal heart sounds. Pulmonary:      Effort: Pulmonary effort is normal.      Breath sounds: Normal breath sounds. Abdominal:      General: Abdomen is flat. Bowel sounds are normal.      Palpations: Abdomen is soft. Musculoskeletal:         General: Normal range of motion. Cervical back: Normal range of motion and neck supple. Comments: Good gait able to walk on Hips and Knees, mild to moderate pain on Lumbar flexion and Extension, Tender SI joinrts no pain in Facets, Pain on Gainslins and Hip Grinding, Reflexes intact both sides. Skin:     General: Skin is warm. Capillary Refill: Capillary refill takes less than 2 seconds. Neurological:      General: No focal deficit present. Mental Status: He is alert and oriented to person, place, and time. Mental status is at baseline. Psychiatric:         Mood and Affect: Mood normal.         Behavior: Behavior normal.         Thought Content:  Thought content normal.         Judgment: Judgment normal.         Plan     Discussed SI Joint Vs Hip joints as source of pain, PT is current

## 2023-06-09 DIAGNOSIS — E11.42 TYPE 2 DIABETES MELLITUS WITH DIABETIC POLYNEUROPATHY, WITHOUT LONG-TERM CURRENT USE OF INSULIN (MULTI): Primary | ICD-10-CM

## 2023-06-09 DIAGNOSIS — F32.A ANXIETY AND DEPRESSION: ICD-10-CM

## 2023-06-09 DIAGNOSIS — G89.29 CHRONIC LOW BACK PAIN, UNSPECIFIED BACK PAIN LATERALITY, UNSPECIFIED WHETHER SCIATICA PRESENT: ICD-10-CM

## 2023-06-09 DIAGNOSIS — F41.9 ANXIETY AND DEPRESSION: ICD-10-CM

## 2023-06-09 DIAGNOSIS — M54.50 CHRONIC LOW BACK PAIN, UNSPECIFIED BACK PAIN LATERALITY, UNSPECIFIED WHETHER SCIATICA PRESENT: ICD-10-CM

## 2023-06-09 RX ORDER — ORAL SEMAGLUTIDE 3 MG/1
TABLET ORAL
Qty: 90 TABLET | Refills: 0 | Status: SHIPPED | OUTPATIENT
Start: 2023-06-09 | End: 2023-08-24 | Stop reason: SINTOL

## 2023-06-09 RX ORDER — CELECOXIB 100 MG/1
CAPSULE ORAL
Qty: 60 CAPSULE | Refills: 0 | Status: SHIPPED | OUTPATIENT
Start: 2023-06-09 | End: 2023-10-12 | Stop reason: SDUPTHER

## 2023-06-09 RX ORDER — BUSPIRONE HYDROCHLORIDE 10 MG/1
TABLET ORAL
Qty: 60 TABLET | Refills: 0 | Status: SHIPPED | OUTPATIENT
Start: 2023-06-09 | End: 2023-08-24 | Stop reason: SDUPTHER

## 2023-06-26 ENCOUNTER — OFFICE VISIT (OUTPATIENT)
Dept: PAIN MANAGEMENT | Age: 53
End: 2023-06-26
Payer: MEDICARE

## 2023-06-26 VITALS
BODY MASS INDEX: 30.16 KG/M2 | OXYGEN SATURATION: 96 % | SYSTOLIC BLOOD PRESSURE: 124 MMHG | TEMPERATURE: 98.2 F | HEART RATE: 64 BPM | WEIGHT: 199 LBS | HEIGHT: 68 IN | DIASTOLIC BLOOD PRESSURE: 86 MMHG

## 2023-06-26 DIAGNOSIS — M47.812 CERVICAL SPONDYLOSIS WITHOUT MYELOPATHY: ICD-10-CM

## 2023-06-26 DIAGNOSIS — M54.12 CERVICAL RADICULOPATHY: Primary | ICD-10-CM

## 2023-06-26 PROCEDURE — 3017F COLORECTAL CA SCREEN DOC REV: CPT | Performed by: PAIN MEDICINE

## 2023-06-26 PROCEDURE — 4004F PT TOBACCO SCREEN RCVD TLK: CPT | Performed by: PAIN MEDICINE

## 2023-06-26 PROCEDURE — G8417 CALC BMI ABV UP PARAM F/U: HCPCS | Performed by: PAIN MEDICINE

## 2023-06-26 PROCEDURE — G8427 DOCREV CUR MEDS BY ELIG CLIN: HCPCS | Performed by: PAIN MEDICINE

## 2023-06-26 PROCEDURE — 99213 OFFICE O/P EST LOW 20 MIN: CPT | Performed by: PAIN MEDICINE

## 2023-06-26 ASSESSMENT — ENCOUNTER SYMPTOMS
GASTROINTESTINAL NEGATIVE: 1
EYES NEGATIVE: 1
RESPIRATORY NEGATIVE: 1
ALLERGIC/IMMUNOLOGIC NEGATIVE: 1

## 2023-06-27 ENCOUNTER — TELEPHONE (OUTPATIENT)
Dept: PAIN MANAGEMENT | Age: 53
End: 2023-06-27

## 2023-06-29 ENCOUNTER — PROCEDURE VISIT (OUTPATIENT)
Dept: PAIN MANAGEMENT | Age: 53
End: 2023-06-29

## 2023-06-29 ENCOUNTER — HOSPITAL ENCOUNTER (OUTPATIENT)
Dept: MRI IMAGING | Age: 53
Discharge: HOME | End: 2023-07-01
Payer: MEDICARE

## 2023-06-29 DIAGNOSIS — M54.12 CERVICAL RADICULOPATHY: ICD-10-CM

## 2023-06-29 DIAGNOSIS — G31.84 COGNITIVE IMPAIRMENT, MILD, SO STATED: ICD-10-CM

## 2023-06-29 PROCEDURE — 6360000004 HC RX CONTRAST MEDICATION: Performed by: PSYCHIATRY & NEUROLOGY

## 2023-06-29 PROCEDURE — 70553 MRI BRAIN STEM W/O & W/DYE: CPT

## 2023-06-29 PROCEDURE — A9577 INJ MULTIHANCE: HCPCS | Performed by: PSYCHIATRY & NEUROLOGY

## 2023-06-29 RX ORDER — TRIAMCINOLONE ACETONIDE 40 MG/ML
40 INJECTION, SUSPENSION INTRA-ARTICULAR; INTRAMUSCULAR ONCE
Status: COMPLETED | OUTPATIENT
Start: 2023-06-29 | End: 2023-06-29

## 2023-06-29 RX ORDER — BUPIVACAINE HYDROCHLORIDE 2.5 MG/ML
30 INJECTION, SOLUTION INFILTRATION; PERINEURAL ONCE
Status: COMPLETED | OUTPATIENT
Start: 2023-06-29 | End: 2023-06-29

## 2023-06-29 RX ORDER — LIDOCAINE HYDROCHLORIDE 10 MG/ML
10 INJECTION, SOLUTION EPIDURAL; INFILTRATION; INTRACAUDAL; PERINEURAL ONCE
Status: COMPLETED | OUTPATIENT
Start: 2023-06-29 | End: 2023-06-29

## 2023-06-29 RX ADMIN — BUPIVACAINE HYDROCHLORIDE 75 MG: 2.5 INJECTION, SOLUTION INFILTRATION; PERINEURAL at 13:47

## 2023-06-29 RX ADMIN — TRIAMCINOLONE ACETONIDE 40 MG: 40 INJECTION, SUSPENSION INTRA-ARTICULAR; INTRAMUSCULAR at 13:47

## 2023-06-29 RX ADMIN — GADOBENATE DIMEGLUMINE 15 ML: 529 INJECTION, SOLUTION INTRAVENOUS at 09:10

## 2023-06-29 RX ADMIN — LIDOCAINE HYDROCHLORIDE 10 MG: 10 INJECTION, SOLUTION EPIDURAL; INFILTRATION; INTRACAUDAL; PERINEURAL at 13:47

## 2023-06-29 ASSESSMENT — ENCOUNTER SYMPTOMS
EYES NEGATIVE: 1
RESPIRATORY NEGATIVE: 1
ALLERGIC/IMMUNOLOGIC NEGATIVE: 1
GASTROINTESTINAL NEGATIVE: 1

## 2023-07-14 ENCOUNTER — HOSPITAL ENCOUNTER (OUTPATIENT)
Dept: MRI IMAGING | Age: 53
End: 2023-07-14
Attending: PAIN MEDICINE
Payer: MEDICARE

## 2023-07-14 DIAGNOSIS — M54.12 CERVICAL RADICULOPATHY: ICD-10-CM

## 2023-07-14 DIAGNOSIS — M47.812 CERVICAL SPONDYLOSIS WITHOUT MYELOPATHY: ICD-10-CM

## 2023-07-14 PROCEDURE — A9577 INJ MULTIHANCE: HCPCS | Performed by: PAIN MEDICINE

## 2023-07-14 PROCEDURE — 72156 MRI NECK SPINE W/O & W/DYE: CPT

## 2023-07-14 PROCEDURE — 6360000004 HC RX CONTRAST MEDICATION: Performed by: PAIN MEDICINE

## 2023-07-14 RX ADMIN — GADOBENATE DIMEGLUMINE 20 ML: 529 INJECTION, SOLUTION INTRAVENOUS at 18:06

## 2023-07-17 ENCOUNTER — OFFICE VISIT (OUTPATIENT)
Dept: PAIN MANAGEMENT | Age: 53
End: 2023-07-17
Payer: MEDICARE

## 2023-07-17 VITALS
HEIGHT: 68 IN | TEMPERATURE: 97.2 F | WEIGHT: 190 LBS | SYSTOLIC BLOOD PRESSURE: 104 MMHG | BODY MASS INDEX: 28.79 KG/M2 | DIASTOLIC BLOOD PRESSURE: 60 MMHG

## 2023-07-17 DIAGNOSIS — M46.1 BILATERAL SACROILIITIS (HCC): ICD-10-CM

## 2023-07-17 DIAGNOSIS — M47.812 CERVICAL SPONDYLOSIS WITHOUT MYELOPATHY: ICD-10-CM

## 2023-07-17 DIAGNOSIS — M16.12 UNILATERAL PRIMARY OSTEOARTHRITIS, LEFT HIP: ICD-10-CM

## 2023-07-17 DIAGNOSIS — M54.12 CERVICAL RADICULOPATHY: Primary | ICD-10-CM

## 2023-07-17 PROCEDURE — 4004F PT TOBACCO SCREEN RCVD TLK: CPT | Performed by: PAIN MEDICINE

## 2023-07-17 PROCEDURE — 99213 OFFICE O/P EST LOW 20 MIN: CPT | Performed by: PAIN MEDICINE

## 2023-07-17 PROCEDURE — G8427 DOCREV CUR MEDS BY ELIG CLIN: HCPCS | Performed by: PAIN MEDICINE

## 2023-07-17 PROCEDURE — 3017F COLORECTAL CA SCREEN DOC REV: CPT | Performed by: PAIN MEDICINE

## 2023-07-17 PROCEDURE — G8417 CALC BMI ABV UP PARAM F/U: HCPCS | Performed by: PAIN MEDICINE

## 2023-07-17 RX ORDER — CELECOXIB 100 MG/1
100 CAPSULE ORAL 2 TIMES DAILY
Qty: 60 CAPSULE | Refills: 5 | Status: SHIPPED | OUTPATIENT
Start: 2023-07-17

## 2023-07-17 ASSESSMENT — ENCOUNTER SYMPTOMS
ALLERGIC/IMMUNOLOGIC NEGATIVE: 1
EYES NEGATIVE: 1
GASTROINTESTINAL NEGATIVE: 1
RESPIRATORY NEGATIVE: 1

## 2023-08-07 DIAGNOSIS — E11.42 TYPE 2 DIABETES MELLITUS WITH DIABETIC POLYNEUROPATHY, WITHOUT LONG-TERM CURRENT USE OF INSULIN (MULTI): ICD-10-CM

## 2023-08-09 RX ORDER — DAPAGLIFLOZIN 5 MG/1
5 TABLET, FILM COATED ORAL DAILY
Qty: 30 TABLET | Refills: 0 | Status: SHIPPED | OUTPATIENT
Start: 2023-08-09 | End: 2023-08-24 | Stop reason: SDUPTHER

## 2023-08-17 ENCOUNTER — CLINICAL SUPPORT (OUTPATIENT)
Dept: PRIMARY CARE | Facility: CLINIC | Age: 53
End: 2023-08-17
Payer: COMMERCIAL

## 2023-08-17 DIAGNOSIS — E53.8 VITAMIN B 12 DEFICIENCY: ICD-10-CM

## 2023-08-17 DIAGNOSIS — E78.5 HYPERLIPIDEMIA, UNSPECIFIED HYPERLIPIDEMIA TYPE: ICD-10-CM

## 2023-08-17 DIAGNOSIS — E11.42 TYPE 2 DIABETES MELLITUS WITH DIABETIC POLYNEUROPATHY, WITHOUT LONG-TERM CURRENT USE OF INSULIN (MULTI): ICD-10-CM

## 2023-08-17 DIAGNOSIS — E55.9 VITAMIN D DEFICIENCY: ICD-10-CM

## 2023-08-17 LAB
CALCIDIOL (25 OH VITAMIN D3) (NG/ML) IN SER/PLAS: 32 NG/ML
CHOLESTEROL (MG/DL) IN SER/PLAS: 195 MG/DL (ref 0–199)
CHOLESTEROL IN HDL (MG/DL) IN SER/PLAS: 31.2 MG/DL
CHOLESTEROL/HDL RATIO: 6.3
ESTIMATED AVERAGE GLUCOSE FOR HBA1C: 189 MG/DL
HEMOGLOBIN A1C/HEMOGLOBIN TOTAL IN BLOOD: 8.2 %
LDL: ABNORMAL MG/DL (ref 0–99)
TRIGLYCERIDE (MG/DL) IN SER/PLAS: 598 MG/DL (ref 0–149)
VLDL: ABNORMAL MG/DL (ref 0–40)

## 2023-08-17 PROCEDURE — 83721 ASSAY OF BLOOD LIPOPROTEIN: CPT

## 2023-08-17 PROCEDURE — 96372 THER/PROPH/DIAG INJ SC/IM: CPT | Performed by: FAMILY MEDICINE

## 2023-08-17 PROCEDURE — 83036 HEMOGLOBIN GLYCOSYLATED A1C: CPT

## 2023-08-17 PROCEDURE — 80061 LIPID PANEL: CPT

## 2023-08-17 PROCEDURE — 82306 VITAMIN D 25 HYDROXY: CPT

## 2023-08-17 RX ORDER — CYANOCOBALAMIN 1000 UG/ML
1000 INJECTION, SOLUTION INTRAMUSCULAR; SUBCUTANEOUS ONCE
Status: COMPLETED | OUTPATIENT
Start: 2023-08-17 | End: 2023-08-17

## 2023-08-17 RX ADMIN — CYANOCOBALAMIN 1000 MCG: 1000 INJECTION, SOLUTION INTRAMUSCULAR; SUBCUTANEOUS at 08:20

## 2023-08-17 NOTE — PROGRESS NOTES
Subjective   Patient ID: Maury Contreras is a 53 y.o. male who presents for No chief complaint on file..    HPI     Review of Systems    Objective   There were no vitals taken for this visit.    Physical Exam    Assessment/Plan

## 2023-08-18 DIAGNOSIS — E11.42 TYPE 2 DIABETES MELLITUS WITH DIABETIC POLYNEUROPATHY, WITHOUT LONG-TERM CURRENT USE OF INSULIN (MULTI): ICD-10-CM

## 2023-08-18 LAB — CHOLESTEROL IN LDL (MG/DL) IN SER/PLAS BY DIRECT ASSAY: 99 MG/DL (ref 0–129)

## 2023-08-18 RX ORDER — DAPAGLIFLOZIN 5 MG/1
5 TABLET, FILM COATED ORAL DAILY
Qty: 30 TABLET | Refills: 0 | OUTPATIENT
Start: 2023-08-18

## 2023-08-24 ENCOUNTER — OFFICE VISIT (OUTPATIENT)
Dept: PRIMARY CARE | Facility: CLINIC | Age: 53
End: 2023-08-24
Payer: COMMERCIAL

## 2023-08-24 VITALS
HEIGHT: 68 IN | SYSTOLIC BLOOD PRESSURE: 127 MMHG | WEIGHT: 190 LBS | OXYGEN SATURATION: 95 % | BODY MASS INDEX: 28.79 KG/M2 | DIASTOLIC BLOOD PRESSURE: 75 MMHG | HEART RATE: 77 BPM

## 2023-08-24 DIAGNOSIS — E53.8 VITAMIN B 12 DEFICIENCY: ICD-10-CM

## 2023-08-24 DIAGNOSIS — I10 HYPERTENSION, UNSPECIFIED TYPE: ICD-10-CM

## 2023-08-24 DIAGNOSIS — Z79.899 DRUG THERAPY: ICD-10-CM

## 2023-08-24 DIAGNOSIS — Z12.5 SCREENING FOR PROSTATE CANCER: ICD-10-CM

## 2023-08-24 DIAGNOSIS — Z00.00 PREVENTATIVE HEALTH CARE: ICD-10-CM

## 2023-08-24 DIAGNOSIS — E11.42 TYPE 2 DIABETES MELLITUS WITH DIABETIC POLYNEUROPATHY, WITHOUT LONG-TERM CURRENT USE OF INSULIN (MULTI): Primary | ICD-10-CM

## 2023-08-24 DIAGNOSIS — E78.5 HYPERLIPIDEMIA, UNSPECIFIED HYPERLIPIDEMIA TYPE: ICD-10-CM

## 2023-08-24 DIAGNOSIS — F32.A ANXIETY AND DEPRESSION: ICD-10-CM

## 2023-08-24 DIAGNOSIS — E55.9 VITAMIN D DEFICIENCY: ICD-10-CM

## 2023-08-24 DIAGNOSIS — Z13.9 SCREENING FOR CONDITION: ICD-10-CM

## 2023-08-24 DIAGNOSIS — F41.9 ANXIETY AND DEPRESSION: ICD-10-CM

## 2023-08-24 PROCEDURE — 3052F HG A1C>EQUAL 8.0%<EQUAL 9.0%: CPT | Performed by: FAMILY MEDICINE

## 2023-08-24 PROCEDURE — 3078F DIAST BP <80 MM HG: CPT | Performed by: FAMILY MEDICINE

## 2023-08-24 PROCEDURE — 4004F PT TOBACCO SCREEN RCVD TLK: CPT | Performed by: FAMILY MEDICINE

## 2023-08-24 PROCEDURE — 99214 OFFICE O/P EST MOD 30 MIN: CPT | Performed by: FAMILY MEDICINE

## 2023-08-24 PROCEDURE — 3074F SYST BP LT 130 MM HG: CPT | Performed by: FAMILY MEDICINE

## 2023-08-24 RX ORDER — DAPAGLIFLOZIN 10 MG/1
TABLET, FILM COATED ORAL
Qty: 90 TABLET | Refills: 3 | Status: SHIPPED | OUTPATIENT
Start: 2023-08-24

## 2023-08-24 RX ORDER — BUSPIRONE HYDROCHLORIDE 10 MG/1
TABLET ORAL
Qty: 180 TABLET | Refills: 1 | Status: SHIPPED | OUTPATIENT
Start: 2023-08-24 | End: 2023-11-20 | Stop reason: SDUPTHER

## 2023-08-24 NOTE — PATIENT INSTRUCTIONS
Patient Discussion/Summary     New labs reviewed:  - Type 2 diabetes, with diabetic neuropathy, A1c 8.2, was 8.2, 7.3, 7.2, 6.5, 6.5, 6.4, 6.0, 6.2, 6.5, 6.5, 6.3, 6.9 (oldest). H/O A1c's in the 14s.  No longer on Rybelsus, even at every other day 3 mg dose was not able to tolerate it due to abdominal pain. -->>  Discontinue Rybelsus.  Increase the Farxiga to 10 mg.  - Vitamin D deficiency, 32, was 10, 10, 9, 8, 11, 20, 10, 9, 7, 16. Your goal is 75 - 100 or more.  Is on about 50,000 units once a week.  Has the pills he got from [employer]. -->>  Increase to 2 pills a week and will recheck in 3 months.  -Hyperlipidemia, HDL 31, was 26, 30, 32, 36, 35, goal 45 or more. LDL 99, was N/C, 96, N/C, 116, 93, goal is 70 or less. , was 758, 341, 477, 168, 740. Historically as high as 13,000, averaging 5000s. Patient was taking Crestor 5 mg at that time, is not anymore due to gastrointestinal side effects. Has failed gemfibrozil also due to GI side effects -->> am working on getting sugar under control and we will readdress at that time.    Regarding previous labs:  - Drug therapy, screening for conditions. -->> Recheck annual labs around September 2023.  - Hypogonadism, elevated estrogens, DHEA sulfate is borderline low. Testosterone is probably not optiaml, this is less than optimal.     Depression with anxiety.  Anxiety improved with BuSpar. Doing well on 10 mg twice daily. -->>  Will refill as needed.    HTN, bp good today. -->> CCT.      Smoking about half pack a day, down from 2 packs a day, using Chantix and that seems to be helping. -->>  Keep up the excellent work cutting back.  Of course advise you to completely quit.        - We will return in about 3 months for Medicare wellness visit and lab review.  - Patient will come in about a week before the plan to get fasting annual labs.

## 2023-08-24 NOTE — PROGRESS NOTES
Subjective   Patient ID: Maury Contreras is a 53 y.o. male who presents for Lab Review FU (Pt in today for routine lab review FU).    Review of Systems  Denies N/V/D/C, no HA/S/V, denies rashes/lesions, no CP/SOB. Denies fevers/chills.  All other systems were negative.    Objective   Physical Exam  Gen: NAD  eyes: EOMI, PERRLA  ENT: hearing grossly intact, no nasal discharge  resp: CTABL, without R/R  heart: RRR without MRG  GI: abd: S/ND/NT, BS+  lymph: no axillary, cervical, supraclavicular lymphadenopathy noted   MS: gait grossly WNL,  derm: no rashes or lesions noted  neuro: CN II-XII grossly intact  psych: A&Ox3    Assessment/Plan   Diagnoses and all orders for this visit:  Type 2 diabetes mellitus with diabetic polyneuropathy, without long-term current use of insulin (CMS/[facility])  -     dapagliflozin propanediol (Farxiga) 10 mg; 1 po qd  Anxiety and depression  -     busPIRone (Buspar) 10 mg tablet; TAKE 1/2 TABLET BY MOUTH 2 TIMES DAILY FOR FIRST 2 DAYS, THEN TAKE 1 TABLET 2 TIMES DIALY THEREAFTER  Drug therapy  Screening for condition  Screening for prostate cancer  Vitamin D deficiency  Hyperlipidemia, unspecified hyperlipidemia type  Vitamin B 12 deficiency  Hypertension, unspecified type  Preventative health care         Patient Discussion/Summary     New labs reviewed:  - Type 2 diabetes, with diabetic neuropathy, A1c 8.2, was 8.2, 7.3, 7.2, 6.5, 6.5, 6.4, 6.0, 6.2, 6.5, 6.5, 6.3, 6.9 (oldest). H/O A1c's in the 14s.  No longer on Rybelsus, even at every other day 3 mg dose was not able to tolerate it due to abdominal pain. -->>  Discontinue Rybelsus.  Increase the Farxiga to 10 mg.  - Vitamin D deficiency, 32, was 10, 10, 9, 8, 11, 20, 10, 9, 7, 16. Your goal is 75 - 100 or more.  Is on about 50,000 units once a week.  Has the pills he got from [employer]. -->>  Increase to 2 pills a week and will recheck in 3 months.  -Hyperlipidemia, HDL 31, was 26, 30, 32, 36, 35, goal 45 or more. LDL 99, was N/C, 96,  N/C, 116, 93, goal is 70 or less. , was 758, 341, 477, 168, 740. Historically as high as 13,000, averaging 5000s. Patient was taking Crestor 5 mg at that time, is not anymore due to gastrointestinal side effects. Has failed gemfibrozil also due to GI side effects -->> am working on getting sugar under control and we will readdress at that time.    Regarding previous labs:  - Drug therapy, screening for conditions. -->> Recheck annual labs around September 2023.  - Hypogonadism, elevated estrogens, DHEA sulfate is borderline low. Testosterone is probably not optiaml, this is less than optimal.     Depression with anxiety.  Anxiety improved with BuSpar. Doing well on 10 mg twice daily. -->>  Will refill as needed.    HTN, bp good today. -->> CCT.      Smoking about half pack a day, down from 2 packs a day, using Chantix and that seems to be helping. -->>  Keep up the excellent work cutting back.  Of course advise you to completely quit.        - We will return in about 3 months for Medicare wellness visit and lab review.  - Patient will come in about a week before the plan to get fasting annual labs.

## 2023-09-01 ENCOUNTER — CLINICAL SUPPORT (OUTPATIENT)
Dept: PRIMARY CARE | Facility: CLINIC | Age: 53
End: 2023-09-01
Payer: COMMERCIAL

## 2023-09-01 DIAGNOSIS — E53.8 VITAMIN B 12 DEFICIENCY: ICD-10-CM

## 2023-09-01 PROCEDURE — 96372 THER/PROPH/DIAG INJ SC/IM: CPT | Performed by: FAMILY MEDICINE

## 2023-09-01 RX ORDER — CYANOCOBALAMIN 1000 UG/ML
1000 INJECTION, SOLUTION INTRAMUSCULAR; SUBCUTANEOUS ONCE
Status: COMPLETED | OUTPATIENT
Start: 2023-09-01 | End: 2023-09-01

## 2023-09-01 RX ADMIN — CYANOCOBALAMIN 1000 MCG: 1000 INJECTION, SOLUTION INTRAMUSCULAR; SUBCUTANEOUS at 08:11

## 2023-09-01 NOTE — PROGRESS NOTES
Subjective   Patient ID: Maury Contreras is a 53 y.o. male who presents for B12 Injection (Pt in today for his B12 injection).    HPI     Review of Systems    Objective   There were no vitals taken for this visit.    Physical Exam    Assessment/Plan

## 2023-09-07 DIAGNOSIS — E11.42 TYPE 2 DIABETES MELLITUS WITH DIABETIC POLYNEUROPATHY, WITHOUT LONG-TERM CURRENT USE OF INSULIN (MULTI): ICD-10-CM

## 2023-09-07 RX ORDER — DAPAGLIFLOZIN 5 MG/1
5 TABLET, FILM COATED ORAL DAILY
Qty: 30 TABLET | Refills: 0 | OUTPATIENT
Start: 2023-09-07

## 2023-09-09 DIAGNOSIS — E11.42 TYPE 2 DIABETES MELLITUS WITH DIABETIC POLYNEUROPATHY, WITHOUT LONG-TERM CURRENT USE OF INSULIN (MULTI): ICD-10-CM

## 2023-09-11 RX ORDER — ORAL SEMAGLUTIDE 3 MG/1
TABLET ORAL
Qty: 90 TABLET | Refills: 0 | Status: SHIPPED | OUTPATIENT
Start: 2023-09-11 | End: 2023-11-20 | Stop reason: SINTOL

## 2023-09-14 ENCOUNTER — CLINICAL SUPPORT (OUTPATIENT)
Dept: PRIMARY CARE | Facility: CLINIC | Age: 53
End: 2023-09-14
Payer: COMMERCIAL

## 2023-09-14 DIAGNOSIS — E53.8 VITAMIN B 12 DEFICIENCY: ICD-10-CM

## 2023-09-14 PROCEDURE — 96372 THER/PROPH/DIAG INJ SC/IM: CPT | Performed by: FAMILY MEDICINE

## 2023-09-14 RX ORDER — CYANOCOBALAMIN 1000 UG/ML
1000 INJECTION, SOLUTION INTRAMUSCULAR; SUBCUTANEOUS ONCE
Status: COMPLETED | OUTPATIENT
Start: 2023-09-14 | End: 2023-09-14

## 2023-09-14 RX ADMIN — CYANOCOBALAMIN 1000 MCG: 1000 INJECTION, SOLUTION INTRAMUSCULAR; SUBCUTANEOUS at 08:03

## 2023-09-28 ENCOUNTER — CLINICAL SUPPORT (OUTPATIENT)
Dept: PRIMARY CARE | Facility: CLINIC | Age: 53
End: 2023-09-28
Payer: COMMERCIAL

## 2023-09-28 DIAGNOSIS — E53.8 VITAMIN B 12 DEFICIENCY: ICD-10-CM

## 2023-09-28 PROCEDURE — 96372 THER/PROPH/DIAG INJ SC/IM: CPT | Performed by: FAMILY MEDICINE

## 2023-09-28 RX ORDER — CYANOCOBALAMIN 1000 UG/ML
1000 INJECTION, SOLUTION INTRAMUSCULAR; SUBCUTANEOUS ONCE
Status: COMPLETED | OUTPATIENT
Start: 2023-09-28 | End: 2023-09-28

## 2023-09-28 RX ADMIN — CYANOCOBALAMIN 1000 MCG: 1000 INJECTION, SOLUTION INTRAMUSCULAR; SUBCUTANEOUS at 08:39

## 2023-09-28 NOTE — PROGRESS NOTES
Subjective   Patient ID: Maury Contreras is a 53 y.o. male who presents for B12 Injection (Pt in today for B12 Injection).    HPI     Review of Systems    Objective   There were no vitals taken for this visit.    Physical Exam    Assessment/Plan

## 2023-10-12 ENCOUNTER — CLINICAL SUPPORT (OUTPATIENT)
Dept: PRIMARY CARE | Facility: CLINIC | Age: 53
End: 2023-10-12
Payer: COMMERCIAL

## 2023-10-12 DIAGNOSIS — M54.50 CHRONIC LOW BACK PAIN, UNSPECIFIED BACK PAIN LATERALITY, UNSPECIFIED WHETHER SCIATICA PRESENT: ICD-10-CM

## 2023-10-12 DIAGNOSIS — E53.8 VITAMIN B 12 DEFICIENCY: ICD-10-CM

## 2023-10-12 DIAGNOSIS — G89.29 CHRONIC LOW BACK PAIN, UNSPECIFIED BACK PAIN LATERALITY, UNSPECIFIED WHETHER SCIATICA PRESENT: ICD-10-CM

## 2023-10-12 PROCEDURE — 96372 THER/PROPH/DIAG INJ SC/IM: CPT | Performed by: FAMILY MEDICINE

## 2023-10-12 RX ORDER — CELECOXIB 100 MG/1
CAPSULE ORAL
Qty: 60 CAPSULE | Refills: 0 | Status: SHIPPED | OUTPATIENT
Start: 2023-10-12 | End: 2023-11-20 | Stop reason: SDUPTHER

## 2023-10-12 RX ORDER — CYANOCOBALAMIN 1000 UG/ML
1000 INJECTION, SOLUTION INTRAMUSCULAR; SUBCUTANEOUS ONCE
Status: COMPLETED | OUTPATIENT
Start: 2023-10-12 | End: 2023-10-12

## 2023-10-12 RX ADMIN — CYANOCOBALAMIN 1000 MCG: 1000 INJECTION, SOLUTION INTRAMUSCULAR; SUBCUTANEOUS at 09:06

## 2023-10-12 NOTE — PROGRESS NOTES
BSubjective   Patient ID: Maury Contreras is a 53 y.o. male who presents for B12 Injection (Pt in today for his B12 injection).    HPI     Review of Systems    Objective   There were no vitals taken for this visit.    Physical Exam    Assessment/Plan

## 2023-10-26 ENCOUNTER — CLINICAL SUPPORT (OUTPATIENT)
Dept: PRIMARY CARE | Facility: CLINIC | Age: 53
End: 2023-10-26
Payer: COMMERCIAL

## 2023-10-26 DIAGNOSIS — E53.8 VITAMIN B 12 DEFICIENCY: ICD-10-CM

## 2023-10-26 PROCEDURE — 96372 THER/PROPH/DIAG INJ SC/IM: CPT | Performed by: FAMILY MEDICINE

## 2023-10-26 RX ORDER — CYANOCOBALAMIN 1000 UG/ML
1000 INJECTION, SOLUTION INTRAMUSCULAR; SUBCUTANEOUS ONCE
Status: COMPLETED | OUTPATIENT
Start: 2023-10-26 | End: 2023-10-26

## 2023-10-26 RX ADMIN — CYANOCOBALAMIN 1000 MCG: 1000 INJECTION, SOLUTION INTRAMUSCULAR; SUBCUTANEOUS at 07:44

## 2023-11-13 ENCOUNTER — CLINICAL SUPPORT (OUTPATIENT)
Dept: PRIMARY CARE | Facility: CLINIC | Age: 53
End: 2023-11-13
Payer: COMMERCIAL

## 2023-11-13 DIAGNOSIS — E55.9 VITAMIN D DEFICIENCY: ICD-10-CM

## 2023-11-13 DIAGNOSIS — E78.5 HYPERLIPIDEMIA, UNSPECIFIED HYPERLIPIDEMIA TYPE: ICD-10-CM

## 2023-11-13 DIAGNOSIS — Z00.00 PREVENTATIVE HEALTH CARE: ICD-10-CM

## 2023-11-13 DIAGNOSIS — Z79.899 DRUG THERAPY: ICD-10-CM

## 2023-11-13 DIAGNOSIS — E11.42 TYPE 2 DIABETES MELLITUS WITH DIABETIC POLYNEUROPATHY, WITHOUT LONG-TERM CURRENT USE OF INSULIN (MULTI): ICD-10-CM

## 2023-11-13 DIAGNOSIS — Z12.5 SCREENING FOR PROSTATE CANCER: ICD-10-CM

## 2023-11-13 DIAGNOSIS — Z13.9 SCREENING FOR CONDITION: ICD-10-CM

## 2023-11-13 DIAGNOSIS — E53.8 VITAMIN B 12 DEFICIENCY: ICD-10-CM

## 2023-11-13 LAB
25(OH)D3 SERPL-MCNC: 56 NG/ML (ref 30–100)
ALBUMIN SERPL BCP-MCNC: 4.3 G/DL (ref 3.4–5)
ALP SERPL-CCNC: 89 U/L (ref 33–120)
ALT SERPL W P-5'-P-CCNC: 16 U/L (ref 10–52)
ANION GAP SERPL CALC-SCNC: 14 MMOL/L (ref 10–20)
APPEARANCE UR: CLEAR
AST SERPL W P-5'-P-CCNC: 13 U/L (ref 9–39)
BASOPHILS # BLD AUTO: 0.06 X10*3/UL (ref 0–0.1)
BASOPHILS NFR BLD AUTO: 0.9 %
BILIRUB SERPL-MCNC: 0.6 MG/DL (ref 0–1.2)
BILIRUB UR STRIP.AUTO-MCNC: NEGATIVE MG/DL
BUN SERPL-MCNC: 11 MG/DL (ref 6–23)
CALCIUM SERPL-MCNC: 9.3 MG/DL (ref 8.6–10.3)
CHLORIDE SERPL-SCNC: 101 MMOL/L (ref 98–107)
CHOLEST SERPL-MCNC: 209 MG/DL (ref 0–199)
CHOLESTEROL/HDL RATIO: 6.1
CO2 SERPL-SCNC: 27 MMOL/L (ref 21–32)
COLOR UR: YELLOW
CREAT SERPL-MCNC: 0.91 MG/DL (ref 0.5–1.3)
EOSINOPHIL # BLD AUTO: 0.33 X10*3/UL (ref 0–0.7)
EOSINOPHIL NFR BLD AUTO: 4.7 %
ERYTHROCYTE [DISTWIDTH] IN BLOOD BY AUTOMATED COUNT: 13.4 % (ref 11.5–14.5)
EST. AVERAGE GLUCOSE BLD GHB EST-MCNC: 171 MG/DL
GFR SERPL CREATININE-BSD FRML MDRD: >90 ML/MIN/1.73M*2
GLUCOSE SERPL-MCNC: 184 MG/DL (ref 74–99)
GLUCOSE UR STRIP.AUTO-MCNC: ABNORMAL MG/DL
HBA1C MFR BLD: 7.6 %
HCT VFR BLD AUTO: 43.9 % (ref 41–52)
HDLC SERPL-MCNC: 34 MG/DL
HGB BLD-MCNC: 14.9 G/DL (ref 13.5–17.5)
IMM GRANULOCYTES # BLD AUTO: 0.01 X10*3/UL (ref 0–0.7)
IMM GRANULOCYTES NFR BLD AUTO: 0.1 % (ref 0–0.9)
KETONES UR STRIP.AUTO-MCNC: NEGATIVE MG/DL
LDLC SERPL CALC-MCNC: ABNORMAL MG/DL
LEUKOCYTE ESTERASE UR QL STRIP.AUTO: NEGATIVE
LYMPHOCYTES # BLD AUTO: 2.61 X10*3/UL (ref 1.2–4.8)
LYMPHOCYTES NFR BLD AUTO: 37.1 %
MAGNESIUM SERPL-MCNC: 1.92 MG/DL (ref 1.6–2.4)
MCH RBC QN AUTO: 30 PG (ref 26–34)
MCHC RBC AUTO-ENTMCNC: 33.9 G/DL (ref 32–36)
MCV RBC AUTO: 89 FL (ref 80–100)
MONOCYTES # BLD AUTO: 0.42 X10*3/UL (ref 0.1–1)
MONOCYTES NFR BLD AUTO: 6 %
NEUTROPHILS # BLD AUTO: 3.6 X10*3/UL (ref 1.2–7.7)
NEUTROPHILS NFR BLD AUTO: 51.2 %
NITRITE UR QL STRIP.AUTO: NEGATIVE
NON HDL CHOLESTEROL: 175 MG/DL (ref 0–149)
NRBC BLD-RTO: 0 /100 WBCS (ref 0–0)
PH UR STRIP.AUTO: 6 [PH]
PLATELET # BLD AUTO: 217 X10*3/UL (ref 150–450)
POTASSIUM SERPL-SCNC: 3.8 MMOL/L (ref 3.5–5.3)
PROT SERPL-MCNC: 6.5 G/DL (ref 6.4–8.2)
PROT UR STRIP.AUTO-MCNC: NEGATIVE MG/DL
PSA SERPL-MCNC: 0.76 NG/ML
RBC # BLD AUTO: 4.96 X10*6/UL (ref 4.5–5.9)
RBC # UR STRIP.AUTO: NEGATIVE /UL
SODIUM SERPL-SCNC: 138 MMOL/L (ref 136–145)
SP GR UR STRIP.AUTO: 1.03
TRIGL SERPL-MCNC: 441 MG/DL (ref 0–149)
TSH SERPL-ACNC: 0.86 MIU/L (ref 0.44–3.98)
UROBILINOGEN UR STRIP.AUTO-MCNC: 2 MG/DL
VLDL: ABNORMAL
WBC # BLD AUTO: 7 X10*3/UL (ref 4.4–11.3)

## 2023-11-13 PROCEDURE — G0103 PSA SCREENING: HCPCS

## 2023-11-13 PROCEDURE — 96372 THER/PROPH/DIAG INJ SC/IM: CPT | Performed by: FAMILY MEDICINE

## 2023-11-13 PROCEDURE — 36415 COLL VENOUS BLD VENIPUNCTURE: CPT

## 2023-11-13 PROCEDURE — 83036 HEMOGLOBIN GLYCOSYLATED A1C: CPT

## 2023-11-13 PROCEDURE — 85025 COMPLETE CBC W/AUTO DIFF WBC: CPT

## 2023-11-13 PROCEDURE — 82306 VITAMIN D 25 HYDROXY: CPT

## 2023-11-13 PROCEDURE — 80053 COMPREHEN METABOLIC PANEL: CPT

## 2023-11-13 PROCEDURE — 81003 URINALYSIS AUTO W/O SCOPE: CPT

## 2023-11-13 PROCEDURE — 83735 ASSAY OF MAGNESIUM: CPT

## 2023-11-13 PROCEDURE — 80061 LIPID PANEL: CPT

## 2023-11-13 PROCEDURE — 84443 ASSAY THYROID STIM HORMONE: CPT

## 2023-11-13 RX ORDER — CYANOCOBALAMIN 1000 UG/ML
1000 INJECTION, SOLUTION INTRAMUSCULAR; SUBCUTANEOUS ONCE
Status: COMPLETED | OUTPATIENT
Start: 2023-11-13 | End: 2023-11-13

## 2023-11-13 RX ADMIN — CYANOCOBALAMIN 1000 MCG: 1000 INJECTION, SOLUTION INTRAMUSCULAR; SUBCUTANEOUS at 07:43

## 2023-11-20 ENCOUNTER — OFFICE VISIT (OUTPATIENT)
Dept: PRIMARY CARE | Facility: CLINIC | Age: 53
End: 2023-11-20
Payer: COMMERCIAL

## 2023-11-20 VITALS
OXYGEN SATURATION: 94 % | HEART RATE: 64 BPM | SYSTOLIC BLOOD PRESSURE: 99 MMHG | DIASTOLIC BLOOD PRESSURE: 64 MMHG | HEIGHT: 68 IN | WEIGHT: 193 LBS | BODY MASS INDEX: 29.25 KG/M2

## 2023-11-20 DIAGNOSIS — M54.50 CHRONIC LOW BACK PAIN, UNSPECIFIED BACK PAIN LATERALITY, UNSPECIFIED WHETHER SCIATICA PRESENT: ICD-10-CM

## 2023-11-20 DIAGNOSIS — E78.5 HYPERLIPIDEMIA, UNSPECIFIED HYPERLIPIDEMIA TYPE: ICD-10-CM

## 2023-11-20 DIAGNOSIS — E11.42 TYPE 2 DIABETES MELLITUS WITH DIABETIC POLYNEUROPATHY, WITHOUT LONG-TERM CURRENT USE OF INSULIN (MULTI): ICD-10-CM

## 2023-11-20 DIAGNOSIS — E53.8 VITAMIN B 12 DEFICIENCY: ICD-10-CM

## 2023-11-20 DIAGNOSIS — G89.29 CHRONIC LOW BACK PAIN, UNSPECIFIED BACK PAIN LATERALITY, UNSPECIFIED WHETHER SCIATICA PRESENT: ICD-10-CM

## 2023-11-20 DIAGNOSIS — F32.A ANXIETY AND DEPRESSION: ICD-10-CM

## 2023-11-20 DIAGNOSIS — Z79.899 DRUG THERAPY: ICD-10-CM

## 2023-11-20 DIAGNOSIS — F41.9 ANXIETY: ICD-10-CM

## 2023-11-20 DIAGNOSIS — F41.9 ANXIETY AND DEPRESSION: ICD-10-CM

## 2023-11-20 DIAGNOSIS — Z00.00 ROUTINE GENERAL MEDICAL EXAMINATION AT HEALTH CARE FACILITY: Primary | ICD-10-CM

## 2023-11-20 DIAGNOSIS — I10 HYPERTENSION, UNSPECIFIED TYPE: ICD-10-CM

## 2023-11-20 DIAGNOSIS — E55.9 VITAMIN D DEFICIENCY: ICD-10-CM

## 2023-11-20 PROCEDURE — G0439 PPPS, SUBSEQ VISIT: HCPCS | Performed by: FAMILY MEDICINE

## 2023-11-20 PROCEDURE — 99417 PROLNG OP E/M EACH 15 MIN: CPT | Performed by: FAMILY MEDICINE

## 2023-11-20 PROCEDURE — 4004F PT TOBACCO SCREEN RCVD TLK: CPT | Performed by: FAMILY MEDICINE

## 2023-11-20 PROCEDURE — 99215 OFFICE O/P EST HI 40 MIN: CPT | Performed by: FAMILY MEDICINE

## 2023-11-20 PROCEDURE — 3078F DIAST BP <80 MM HG: CPT | Performed by: FAMILY MEDICINE

## 2023-11-20 PROCEDURE — 3074F SYST BP LT 130 MM HG: CPT | Performed by: FAMILY MEDICINE

## 2023-11-20 PROCEDURE — 3051F HG A1C>EQUAL 7.0%<8.0%: CPT | Performed by: FAMILY MEDICINE

## 2023-11-20 RX ORDER — BUSPIRONE HYDROCHLORIDE 10 MG/1
TABLET ORAL
Qty: 360 TABLET | Refills: 1 | Status: SHIPPED | OUTPATIENT
Start: 2023-11-20

## 2023-11-20 RX ORDER — CELECOXIB 100 MG/1
CAPSULE ORAL
Qty: 60 CAPSULE | Refills: 0 | Status: SHIPPED | OUTPATIENT
Start: 2023-11-20 | End: 2024-02-12 | Stop reason: SDUPTHER

## 2023-11-20 RX ORDER — CAPSAICIN 0.75 MG/G
CREAM TOPICAL
Qty: 120 G | Refills: 3 | Status: SHIPPED | OUTPATIENT
Start: 2023-11-20

## 2023-11-20 RX ORDER — FENOFIBRATE 54 MG/1
54 TABLET ORAL DAILY
Qty: 90 TABLET | Refills: 3 | Status: SHIPPED | OUTPATIENT
Start: 2023-11-20 | End: 2024-02-19 | Stop reason: SDUPTHER

## 2023-11-20 ASSESSMENT — ACTIVITIES OF DAILY LIVING (ADL)
TAKING_MEDICATION: INDEPENDENT
DRESSING: INDEPENDENT
DOING_HOUSEWORK: INDEPENDENT
GROCERY_SHOPPING: INDEPENDENT
MANAGING_FINANCES: INDEPENDENT
BATHING: INDEPENDENT

## 2023-11-20 ASSESSMENT — ENCOUNTER SYMPTOMS
LOSS OF SENSATION IN FEET: 0
DEPRESSION: 0
OCCASIONAL FEELINGS OF UNSTEADINESS: 0

## 2023-11-20 NOTE — PROGRESS NOTES
"Subjective   Reason for Visit: Maury Contreras is an 53 y.o. male here for a Medicare Wellness visit.          Reviewed all medications by prescribing practitioner or clinical pharmacist (such as prescriptions, OTCs, herbal therapies and supplements) and documented in the medical record.      Patient Care Team:  Art Blair DO as PCP - General  Art Blair DO as PCP - [employer] PCP     Review of Systems  Denies N/V/D/C, no HA/S/V, denies rashes/lesions, no CP/SOB. Denies fevers/chills. Worsening anxiety.  All other systems were negative.    Objective   Vitals:  BP 99/64 (BP Location: Left arm, Patient Position: Sitting)   Pulse 64   Ht 1.727 m (5' 8\")   Wt 87.5 kg (193 lb)   SpO2 94%   BMI 29.35 kg/m²       Physical Exam  Gen: NAD  eyes: EOMI, PERRLA  ENT: hearing grossly intact, no nasal discharge  resp: CTABL, without R/R  heart: RRR without MRG  GI: abd: S/ND/NT, BS+  lymph: no axillary, cervical, supraclavicular lymphadenopathy noted   MS: gait grossly WNL,  derm: no rashes or lesions noted  neuro: CN II-XII grossly intact  psych: A&Ox3    Assessment/Plan   Problem List Items Addressed This Visit       Anxiety    HTN (hypertension)    Hyperlipidemia    Relevant Medications    fenofibrate (Tricor) 54 mg tablet    Type 2 diabetes mellitus (CMS/[facility])    Relevant Medications    sitaGLIPtin phosphate (Januvia) 25 mg tablet    Vitamin B 12 deficiency    Vitamin D deficiency     Other Visit Diagnoses       Routine general medical examination at health care facility    -  Primary    Anxiety and depression        Relevant Medications    busPIRone (Buspar) 10 mg tablet    Chronic low back pain, unspecified back pain laterality, unspecified whether sciatica present        Relevant Medications    celecoxib (CeleBREX) 100 mg capsule    capsicum (Zostrix) 0.075 % topical cream    Drug therapy                       Patient Discussion/Summary    -----  Screening for prostate cancer, PSA is " 0.76, totally normal.    -----     New annual labs reviewed:  - Type 2 diabetes, with diabetic neuropathy, A1c 7.6, was 8.2, 8.2, 7.3, 7.2, 6.5, 6.5, 6.4, 6.0, 6.2, 6.5, 6.5, 6.3, 6.9 (oldest). H/O A1c's in the 14s.  Failed Rybelsus (abdominal pain).  On Farxiga 10 mg. -->>  We will start on low-dose Januvia.  Recheck A1c in 3 months.  - Vitamin D deficiency, 56, was 32, 10, 10, 9, 8, 11, 20, 10, 9, 7, 16. Your goal is 75 - 100 or more.  Is on about 50,000 units twice a week.  Has the pills he got from [employer]. -->>  Continue current dosing.  We will recheck in 3 to 6 months.   -Hyperlipidemia, HDL 34, was 31, 26, 30, 32, 36, 35, goal 45 or more. LDL NC, was 99, N/C, 96, N/C, 116, 93, goal is 70 or less. , was 598, 758, 341, 477, 168, 740. Historically as high as 13,000, averaging 5000s. Failed Crestor and gemfibrozil  (both gastrointestinal side). -->>  We will try prescribing low dose fenofibrate.  Am working on getting sugar under control and we will readdress at that time.  - Drug therapy, screening for conditions. -->> Recheck annual labs around September 2023.     Depression with anxiety. Agoraphobia. Had been doing well on BuSpar.  Last month or so has had a lot of stressors, mood worse, agoraphobia especially bad. On 10 mg twice daily. -->> Will refill as needed. Pt is going to [place] for the light to look for online resources for ongoing agoraphobia.  Certainly try YouTube videos.  Also strongly considers seeing a counselor, and possibly psychiatrist.  We will also increase the dosing on the BuSpar.  Increased to 30 - 40 mg daily.  Start at 30 mg daily.  After 10 to 14 days could increase to 40 mg daily if desired.  If before next appointment you would like a higher dose prescription, call and I will send in 60 mg daily.    HTN, bp excellent today. -->> CCT.    Osteoarthritis, neck, back, shoulders.  Been worse lately.  On Celebrex 100 mg twice daily.  Has also been using a topical capsaicin product  which he has found helpful. -->>  Increase increase increase Celebrex to 200 mg twice daily.  Also prescribed capsaicin topical to see if that is covered.    Smoking a liitle less than half pack a day, down from 2 packs a day, using Chantix and that seems to be helping. -->>  Keep up the excellent work cutting back.  Of course advise you to completely quit.      Occasional N/V, hyperemesis. Dxed from [place] with [facility].    - We will return in about 3 months for Medicare wellness visit and lab review.  - Patient will come in about a week before the plan to get fasting annual labs.

## 2023-11-20 NOTE — PATIENT INSTRUCTIONS
Patient Discussion/Summary    -----  Screening for prostate cancer, PSA is 0.76, totally normal.    -----     New annual labs reviewed:  - Type 2 diabetes, with diabetic neuropathy, A1c 7.6, was 8.2, 8.2, 7.3, 7.2, 6.5, 6.5, 6.4, 6.0, 6.2, 6.5, 6.5, 6.3, 6.9 (oldest). H/O A1c's in the 14s.  Failed Rybelsus (abdominal pain).  On Farxiga 10 mg. -->>  We will start on low-dose Januvia.  Recheck A1c in 3 months.  - Vitamin D deficiency, 56, was 32, 10, 10, 9, 8, 11, 20, 10, 9, 7, 16. Your goal is 75 - 100 or more.  Is on about 50,000 units twice a week.  Has the pills he got from [employer]. -->>  Continue current dosing.  We will recheck in 3 to 6 months.   -Hyperlipidemia, HDL 34, was 31, 26, 30, 32, 36, 35, goal 45 or more. LDL NC, was 99, N/C, 96, N/C, 116, 93, goal is 70 or less. , was 598, 758, 341, 477, 168, 740. Historically as high as 13,000, averaging 5000s. Failed Crestor and gemfibrozil  (both gastrointestinal side). -->>  We will try prescribing low dose fenofibrate.  Am working on getting sugar under control and we will readdress at that time.  - Drug therapy, screening for conditions. -->> Recheck annual labs around September 2023.     Depression with anxiety. Agoraphobia. Had been doing well on BuSpar.  Last month or so has had a lot of stressors, mood worse, agoraphobia especially bad. On 10 mg twice daily. -->> Will refill as needed. Pt is going to [place] for the light to look for online resources for ongoing agoraphobia.  Certainly try YouTube videos.  Also strongly considers seeing a counselor, and possibly psychiatrist.  We will also increase the dosing on the BuSpar.  Increased to 30 - 40 mg daily.  Start at 30 mg daily.  After 10 to 14 days could increase to 40 mg daily if desired.  If before next appointment you would like a higher dose prescription, call and I will send in 60 mg daily.    HTN, bp excellent today. -->> CCT.    Osteoarthritis, neck, back, shoulders.  Been worse lately.  On  Celebrex 100 mg twice daily.  Has also been using a topical capsaicin product which he has found helpful. -->>  Increase increase increase Celebrex to 200 mg twice daily.  Also prescribed capsaicin topical to see if that is covered.    Smoking a liitle less than half pack a day, down from 2 packs a day, using Chantix and that seems to be helping. -->>  Keep up the excellent work cutting back.  Of course advise you to completely quit.        - We will return in about 3 months for Medicare wellness visit and lab review.  - Patient will come in about a week before the plan to get fasting annual labs.

## 2024-01-10 ENCOUNTER — APPOINTMENT (OUTPATIENT)
Dept: PRIMARY CARE | Facility: CLINIC | Age: 54
End: 2024-01-10
Payer: COMMERCIAL

## 2024-02-12 ENCOUNTER — CLINICAL SUPPORT (OUTPATIENT)
Dept: PRIMARY CARE | Facility: CLINIC | Age: 54
End: 2024-02-12
Payer: COMMERCIAL

## 2024-02-12 DIAGNOSIS — K21.9 GASTROESOPHAGEAL REFLUX DISEASE, UNSPECIFIED WHETHER ESOPHAGITIS PRESENT: Primary | ICD-10-CM

## 2024-02-12 DIAGNOSIS — E55.9 VITAMIN D DEFICIENCY: ICD-10-CM

## 2024-02-12 DIAGNOSIS — K21.9 GASTROESOPHAGEAL REFLUX DISEASE, UNSPECIFIED WHETHER ESOPHAGITIS PRESENT: ICD-10-CM

## 2024-02-12 DIAGNOSIS — G89.29 CHRONIC LOW BACK PAIN, UNSPECIFIED BACK PAIN LATERALITY, UNSPECIFIED WHETHER SCIATICA PRESENT: ICD-10-CM

## 2024-02-12 DIAGNOSIS — R53.83 OTHER FATIGUE: ICD-10-CM

## 2024-02-12 DIAGNOSIS — M54.50 CHRONIC LOW BACK PAIN, UNSPECIFIED BACK PAIN LATERALITY, UNSPECIFIED WHETHER SCIATICA PRESENT: ICD-10-CM

## 2024-02-12 DIAGNOSIS — I10 HYPERTENSION, UNSPECIFIED TYPE: ICD-10-CM

## 2024-02-12 DIAGNOSIS — E78.5 HYPERLIPIDEMIA, UNSPECIFIED HYPERLIPIDEMIA TYPE: ICD-10-CM

## 2024-02-12 DIAGNOSIS — E78.1 HYPERTRIGLYCERIDEMIA: ICD-10-CM

## 2024-02-12 DIAGNOSIS — Z79.899 DRUG THERAPY: ICD-10-CM

## 2024-02-12 DIAGNOSIS — F41.9 ANXIETY: ICD-10-CM

## 2024-02-12 DIAGNOSIS — E11.9 DIABETES MELLITUS WITHOUT COMPLICATION (MULTI): ICD-10-CM

## 2024-02-12 DIAGNOSIS — E66.3 OVERWEIGHT: ICD-10-CM

## 2024-02-12 LAB
25(OH)D3 SERPL-MCNC: 68 NG/ML (ref 30–100)
ALBUMIN SERPL BCP-MCNC: 4.4 G/DL (ref 3.4–5)
ALP SERPL-CCNC: 67 U/L (ref 33–120)
ALT SERPL W P-5'-P-CCNC: 19 U/L (ref 10–52)
ANION GAP SERPL CALC-SCNC: 14 MMOL/L (ref 10–20)
APPEARANCE UR: CLEAR
AST SERPL W P-5'-P-CCNC: 12 U/L (ref 9–39)
BASOPHILS # BLD AUTO: 0.08 X10*3/UL (ref 0–0.1)
BASOPHILS NFR BLD AUTO: 1 %
BILIRUB SERPL-MCNC: 0.4 MG/DL (ref 0–1.2)
BILIRUB UR STRIP.AUTO-MCNC: NEGATIVE MG/DL
BUN SERPL-MCNC: 18 MG/DL (ref 6–23)
CALCIUM SERPL-MCNC: 9.6 MG/DL (ref 8.6–10.3)
CHLORIDE SERPL-SCNC: 106 MMOL/L (ref 98–107)
CHOLEST SERPL-MCNC: 193 MG/DL (ref 0–199)
CHOLESTEROL/HDL RATIO: 4.5
CO2 SERPL-SCNC: 25 MMOL/L (ref 21–32)
COLOR UR: YELLOW
CREAT SERPL-MCNC: 1.13 MG/DL (ref 0.5–1.3)
EGFRCR SERPLBLD CKD-EPI 2021: 78 ML/MIN/1.73M*2
EOSINOPHIL # BLD AUTO: 0.8 X10*3/UL (ref 0–0.7)
EOSINOPHIL NFR BLD AUTO: 9.9 %
ERYTHROCYTE [DISTWIDTH] IN BLOOD BY AUTOMATED COUNT: 13.5 % (ref 11.5–14.5)
EST. AVERAGE GLUCOSE BLD GHB EST-MCNC: 171 MG/DL
GLUCOSE SERPL-MCNC: 218 MG/DL (ref 74–99)
GLUCOSE UR STRIP.AUTO-MCNC: ABNORMAL MG/DL
HBA1C MFR BLD: 7.6 %
HCT VFR BLD AUTO: 41.2 % (ref 41–52)
HDLC SERPL-MCNC: 42.6 MG/DL
HGB BLD-MCNC: 13.9 G/DL (ref 13.5–17.5)
IMM GRANULOCYTES # BLD AUTO: 0.05 X10*3/UL (ref 0–0.7)
IMM GRANULOCYTES NFR BLD AUTO: 0.6 % (ref 0–0.9)
KETONES UR STRIP.AUTO-MCNC: NEGATIVE MG/DL
LDLC SERPL CALC-MCNC: 104 MG/DL
LEUKOCYTE ESTERASE UR QL STRIP.AUTO: NEGATIVE
LYMPHOCYTES # BLD AUTO: 2.75 X10*3/UL (ref 1.2–4.8)
LYMPHOCYTES NFR BLD AUTO: 33.9 %
MAGNESIUM SERPL-MCNC: 2.06 MG/DL (ref 1.6–2.4)
MCH RBC QN AUTO: 29.6 PG (ref 26–34)
MCHC RBC AUTO-ENTMCNC: 33.7 G/DL (ref 32–36)
MCV RBC AUTO: 88 FL (ref 80–100)
MONOCYTES # BLD AUTO: 0.42 X10*3/UL (ref 0.1–1)
MONOCYTES NFR BLD AUTO: 5.2 %
NEUTROPHILS # BLD AUTO: 4.01 X10*3/UL (ref 1.2–7.7)
NEUTROPHILS NFR BLD AUTO: 49.4 %
NITRITE UR QL STRIP.AUTO: NEGATIVE
NON HDL CHOLESTEROL: 150 MG/DL (ref 0–149)
NRBC BLD-RTO: 0 /100 WBCS (ref 0–0)
PH UR STRIP.AUTO: 6 [PH]
PLATELET # BLD AUTO: 258 X10*3/UL (ref 150–450)
POTASSIUM SERPL-SCNC: 4.1 MMOL/L (ref 3.5–5.3)
PROT SERPL-MCNC: 6.6 G/DL (ref 6.4–8.2)
PROT UR STRIP.AUTO-MCNC: NEGATIVE MG/DL
RBC # BLD AUTO: 4.69 X10*6/UL (ref 4.5–5.9)
RBC # UR STRIP.AUTO: NEGATIVE /UL
SODIUM SERPL-SCNC: 141 MMOL/L (ref 136–145)
SP GR UR STRIP.AUTO: 1.03
TRIGL SERPL-MCNC: 231 MG/DL (ref 0–149)
TSH SERPL-ACNC: 1.3 MIU/L (ref 0.44–3.98)
UROBILINOGEN UR STRIP.AUTO-MCNC: <2 MG/DL
VLDL: 46 MG/DL (ref 0–40)
WBC # BLD AUTO: 8.1 X10*3/UL (ref 4.4–11.3)

## 2024-02-12 PROCEDURE — 83036 HEMOGLOBIN GLYCOSYLATED A1C: CPT

## 2024-02-12 PROCEDURE — 83735 ASSAY OF MAGNESIUM: CPT

## 2024-02-12 PROCEDURE — 82306 VITAMIN D 25 HYDROXY: CPT

## 2024-02-12 PROCEDURE — 81003 URINALYSIS AUTO W/O SCOPE: CPT

## 2024-02-12 PROCEDURE — 85025 COMPLETE CBC W/AUTO DIFF WBC: CPT

## 2024-02-12 PROCEDURE — 84443 ASSAY THYROID STIM HORMONE: CPT

## 2024-02-12 PROCEDURE — 80053 COMPREHEN METABOLIC PANEL: CPT

## 2024-02-12 PROCEDURE — 80061 LIPID PANEL: CPT

## 2024-02-12 PROCEDURE — 36415 COLL VENOUS BLD VENIPUNCTURE: CPT

## 2024-02-12 RX ORDER — FAMOTIDINE 20 MG/1
20 TABLET, FILM COATED ORAL 2 TIMES DAILY PRN
Qty: 90 TABLET | Refills: 1 | Status: SHIPPED | OUTPATIENT
Start: 2024-02-12

## 2024-02-12 RX ORDER — CELECOXIB 100 MG/1
CAPSULE ORAL
Qty: 60 CAPSULE | Refills: 3 | Status: SHIPPED | OUTPATIENT
Start: 2024-02-12 | End: 2024-02-19 | Stop reason: SDUPTHER

## 2024-02-12 RX ORDER — OMEPRAZOLE 40 MG/1
40 CAPSULE, DELAYED RELEASE ORAL
Qty: 90 CAPSULE | Refills: 1 | Status: SHIPPED | OUTPATIENT
Start: 2024-02-12

## 2024-02-12 NOTE — PROGRESS NOTES
Subjective   Patient ID: Maury Contreras is a 54 y.o. male who presents for Labs Only (Pt in today for lab draw).    HPI     Review of Systems    Objective   There were no vitals taken for this visit.    Physical Exam    Assessment/Plan

## 2024-02-19 ENCOUNTER — OFFICE VISIT (OUTPATIENT)
Dept: PRIMARY CARE | Facility: CLINIC | Age: 54
End: 2024-02-19
Payer: COMMERCIAL

## 2024-02-19 VITALS
DIASTOLIC BLOOD PRESSURE: 73 MMHG | HEIGHT: 68 IN | SYSTOLIC BLOOD PRESSURE: 143 MMHG | WEIGHT: 195 LBS | HEART RATE: 67 BPM | BODY MASS INDEX: 29.55 KG/M2 | OXYGEN SATURATION: 95 %

## 2024-02-19 DIAGNOSIS — M25.511 CHRONIC PAIN OF BOTH SHOULDERS: ICD-10-CM

## 2024-02-19 DIAGNOSIS — N52.9 ERECTILE DYSFUNCTION, UNSPECIFIED ERECTILE DYSFUNCTION TYPE: ICD-10-CM

## 2024-02-19 DIAGNOSIS — G89.29 CHRONIC PAIN OF BOTH SHOULDERS: ICD-10-CM

## 2024-02-19 DIAGNOSIS — K62.5 RECTAL BLEEDING: ICD-10-CM

## 2024-02-19 DIAGNOSIS — Z00.00 ROUTINE GENERAL MEDICAL EXAMINATION AT HEALTH CARE FACILITY: Primary | ICD-10-CM

## 2024-02-19 DIAGNOSIS — Z00.00 PREVENTATIVE HEALTH CARE: ICD-10-CM

## 2024-02-19 DIAGNOSIS — E11.42 TYPE 2 DIABETES MELLITUS WITH DIABETIC POLYNEUROPATHY, WITHOUT LONG-TERM CURRENT USE OF INSULIN (MULTI): ICD-10-CM

## 2024-02-19 DIAGNOSIS — K21.9 GASTROESOPHAGEAL REFLUX DISEASE, UNSPECIFIED WHETHER ESOPHAGITIS PRESENT: ICD-10-CM

## 2024-02-19 DIAGNOSIS — M54.50 CHRONIC LOW BACK PAIN, UNSPECIFIED BACK PAIN LATERALITY, UNSPECIFIED WHETHER SCIATICA PRESENT: ICD-10-CM

## 2024-02-19 DIAGNOSIS — E78.5 HYPERLIPIDEMIA, UNSPECIFIED HYPERLIPIDEMIA TYPE: ICD-10-CM

## 2024-02-19 DIAGNOSIS — M25.512 CHRONIC PAIN OF BOTH SHOULDERS: ICD-10-CM

## 2024-02-19 DIAGNOSIS — F41.9 ANXIETY: ICD-10-CM

## 2024-02-19 DIAGNOSIS — G89.29 CHRONIC LOW BACK PAIN, UNSPECIFIED BACK PAIN LATERALITY, UNSPECIFIED WHETHER SCIATICA PRESENT: ICD-10-CM

## 2024-02-19 PROCEDURE — 99215 OFFICE O/P EST HI 40 MIN: CPT | Performed by: FAMILY MEDICINE

## 2024-02-19 PROCEDURE — 99396 PREV VISIT EST AGE 40-64: CPT | Performed by: FAMILY MEDICINE

## 2024-02-19 PROCEDURE — 3049F LDL-C 100-129 MG/DL: CPT | Performed by: FAMILY MEDICINE

## 2024-02-19 PROCEDURE — G0439 PPPS, SUBSEQ VISIT: HCPCS | Performed by: FAMILY MEDICINE

## 2024-02-19 PROCEDURE — 3077F SYST BP >= 140 MM HG: CPT | Performed by: FAMILY MEDICINE

## 2024-02-19 PROCEDURE — 3051F HG A1C>EQUAL 7.0%<8.0%: CPT | Performed by: FAMILY MEDICINE

## 2024-02-19 PROCEDURE — 3078F DIAST BP <80 MM HG: CPT | Performed by: FAMILY MEDICINE

## 2024-02-19 RX ORDER — FENOFIBRATE 120 MG/1
TABLET ORAL
Qty: 90 TABLET | Refills: 3 | Status: SHIPPED | OUTPATIENT
Start: 2024-02-19

## 2024-02-19 RX ORDER — BACLOFEN 20 MG
TABLET ORAL
Qty: 90 TABLET | Refills: 3 | Status: SHIPPED | OUTPATIENT
Start: 2024-02-19

## 2024-02-19 RX ORDER — CELECOXIB 200 MG/1
CAPSULE ORAL
Qty: 180 CAPSULE | Refills: 1 | Status: SHIPPED | OUTPATIENT
Start: 2024-02-19

## 2024-02-19 RX ORDER — TADALAFIL 5 MG/1
5 TABLET ORAL DAILY
Qty: 90 TABLET | Refills: 3 | Status: SHIPPED | OUTPATIENT
Start: 2024-02-19

## 2024-02-19 ASSESSMENT — ACTIVITIES OF DAILY LIVING (ADL)
DOING_HOUSEWORK: INDEPENDENT
BATHING: INDEPENDENT
DRESSING: INDEPENDENT
GROCERY_SHOPPING: INDEPENDENT
TAKING_MEDICATION: INDEPENDENT
MANAGING_FINANCES: INDEPENDENT

## 2024-02-19 ASSESSMENT — ENCOUNTER SYMPTOMS
OCCASIONAL FEELINGS OF UNSTEADINESS: 0
DEPRESSION: 0
LOSS OF SENSATION IN FEET: 1

## 2024-02-19 NOTE — PROGRESS NOTES
"Subjective   Reason for Visit: Maury Contreras is an 54 y.o. male here for a Medicare Wellness visit.          Reviewed all medications by prescribing practitioner or clinical pharmacist (such as prescriptions, OTCs, herbal therapies and supplements) and documented in the medical record.      Patient Care Team:  Art Blair DO as PCP - General  Art Blair DO as PCP - [employer] PCP     Review of Systems  Denies N/V/D/C, no HA/S/V, denies rashes/lesions, no CP/SOB. Denies fevers/chills.  All other systems were negative.    Objective   Vitals:  /73 (BP Location: Right arm, Patient Position: Sitting)   Pulse 67   Ht 1.727 m (5' 8\")   Wt 88.5 kg (195 lb)   SpO2 95%   BMI 29.65 kg/m²       Physical Exam  Gen: NAD  eyes: EOMI, PERRLA  ENT: hearing grossly intact, no nasal discharge  resp: CTABL, without R/R  heart: RRR without MRG  GI: abd: S/ND/NT, BS+  lymph: no axillary, cervical, supraclavicular lymphadenopathy noted   MS: gait grossly WNL,  derm: no rashes or lesions noted  neuro: CN II-XII grossly intact  psych: A&Ox3    Assessment/Plan   Problem List Items Addressed This Visit       Anxiety    Relevant Medications    magnesium oxide 500 mg tablet    Chronic pain of both shoulders    Relevant Medications    magnesium oxide 500 mg tablet    Erectile dysfunction    Relevant Medications    tadalafil (Cialis) 5 mg tablet    GERD (gastroesophageal reflux disease)    Relevant Orders    Referral to Gastroenterology    Hyperlipidemia    Relevant Medications    fenofibrate (Fenoglide) 120 mg tablet    Rectal bleeding    Relevant Orders    Referral to Gastroenterology    Type 2 diabetes mellitus (CMS/HCC)    Relevant Medications    SITagliptin phosphate (Januvia) 50 mg tablet     Other Visit Diagnoses       Routine general medical examination at health care facility    -  Primary    Chronic low back pain, unspecified back pain laterality, unspecified whether sciatica present        " Relevant Medications    celecoxib (CeleBREX) 200 mg capsule    Preventative health care

## 2024-02-19 NOTE — PATIENT INSTRUCTIONS
Patient Discussion/Summary  Preventative visit and [facility] and lab review.  ---------  Screening for colon cancer. -->>  Next colonoscopy due around April 2026.     Screen for hepatitis C was negative September 2022.     Screening for prostate cancer.  PSA 0.76, was 0.97, 1.0.  Patient does have family history of prostate cancer. -->> We will plan to check PSA annually.     Patient is due for annual flu shot.  Is up to date on coronavirus immunization, due for bivalent booster.  Is also due for shingles immunizations.  Had Tdap immunization in September 2021. -->>  Check with your pharmacy to keep up-to-date on your other immunizations.  ------    Cardiac CT calcium scoring was zero, over read was also negative.     Overweight, bmi 29.  about same as last appt. Down a lot from a max of 390. -->>  Keep up the excellent overall work.  Continue to avoid simple carbohydrates like bread, pasta, potatoes, rice, sugars etc.      Is still having some rectal bleeding at times.  Patient notes when he last saw gastroenterology they recommended follow-up to consider an upper scope if those symptoms continued.  -->> refer to GI.   New labs reviewed: (annual due around November)    - Type 2 diabetes, with diabetic neuropathy, A1c 7.6, was 7.6, 8.2, 8.2, 7.3, 7.2, 6.5, 6.5, 6.4, 6.0, 6.2, 6.5, 6.5, 6.3, 6.9 (oldest). H/O A1c's in the 14s.  Failed Rybelsus (abdominal pain).  On Farxiga 10 mg. -->>  We will start on low-dose Januvia.  Recheck A1c in 3 months.    -Hyperlipidemia, HDL 43, was 34, 31, 26, 30, 32, 36, 35, goal 45 or more. , was NC, 99, N/C, 96, N/C, 116, 93, goal is 70 or less. , was 441, 598, 758, 341, 477, 168, 740. Historically as high as 13,000. Failed Crestor and gemfibrozil  (both gastrointestinal side).  Is now on fenofibrate 54 mg. -->>  We will use fenofibrate 220 mg.  Am working on getting sugar under control and we will readdress at that time.    - Vitamin D deficiency, 68, was 56, 32, 10, 10, 9, 8,  11, 20, 10, 9, 7, 16. Your goal is 75 - 100 or more.  Is on about 50,000 units twice a week.  Has the pills he got from [employer]. -->>  Continue current dosing.  We will recheck in November.    - Drug therapy, screening for conditions. -->> Recheck annual labs around november 2023.     Regarding previous labs:    - B12 deficiency: Has been on B12 shots.  Not necessarily not really noticed any benefit from them. -->>  Will try switching to the pills.  B12, 1000 mcg.  Take 1 daily.  Will check next time we do blood work.    Depression with anxiety. Agoraphobia. Had been doing well on BuSpar.  Tried to increase dose last appointment due to increased stressors, but adding a mid-day dose made him too sleepy. So went back to 10 mg twice daily. -->>  It is still written for 3 times daily.  If you would like to do an experiment he could try taking 1-1/2 in the morning just to see.  Will refill as needed. Also will prescribe magnesium oxide, as it can help with anxiety as well as muscle spasm pain.     HTN, bp borderline today.  Usually very good to excellent numbers here. -->> CCT.    Osteoarthritis, neck, back, shoulders.  Been worse lately.  On Celebrex 100 mg twice daily.  Has also been using a topical capsaicin product which he has found helpful.  We had meant to increase the Celebrex at last appointment but apparently I forgot. -->>  Really increase Celebrex to 200 mg twice daily.  Continue capsaicin.    Smoking, quit December 2023.  No longer needing Chantix. -->> Keep up the excellent work!      [facility]: Occasional N/V, hyperemesis. Dxed from [place] with [facility]. doing well in general using ondansetron/Zofran once a week.  Will refill as needed.    Erectile dysfunction:  Patient finds benefit using Cialis occasionally as needed. -->>  Will refill as needed.    - We will return in about 3 months for in office ECG, A1c, fasting lipid panel.

## 2024-02-29 NOTE — PROGRESS NOTES
CC: GERD.    History of Present Illness:   Maury Contreras is a 54 y.o. male with a PMH of HTN, HLD, DM, obesity, GERD, testicular cancer, anxiety, tobacco abuse who presents to clinic for GERD.    Colonoscopy 4/2023: Normal TI, 5 polyps, diverticulosis, hemorrhoids.    Review of Systems  ROS Negative unless otherwise stated above.    Past Medical/Surgical History  Past Medical History:   Diagnosis Date    Muscle weakness (generalized)     Localized muscle weakness    Personal history of other diseases of the musculoskeletal system and connective tissue     History of arthritis    Personal history of other endocrine, nutritional and metabolic disease     History of diabetes mellitus    Unspecified lesions of oral mucosa     Mouth lesion      Past Surgical History:   Procedure Laterality Date    CHOLECYSTECTOMY  12/06/2017    Cholecystectomy Laparoscopic    COLONOSCOPY  04/19/2018    Complete Colonoscopy    ESOPHAGOGASTRODUODENOSCOPY  04/19/2018    Diagnostic Esophagogastroduodenoscopy    MOUTH SURGERY  08/23/2016    Oral Surgery Tooth Extraction    OTHER SURGICAL HISTORY  09/22/2017    Surgery Testis Orchiectomy        Social History   reports that he has been smoking cigarettes. He has quit using smokeless tobacco.     Family History  family history includes Colon cancer in his cousin; Coronary artery disease in his father and mother; Diabetes in his father's brother and father's sister.     Allergies  No Known Allergies    Medications  Current Outpatient Medications   Medication Instructions    albuterol (Ventolin HFA) 90 mcg/actuation inhaler 2 puffs, inhalation, Every 4 hours PRN    busPIRone (Buspar) 10 mg tablet 1 by mouth 3 times daily for at least 10 to 14 days.  If desired could increase to 2 pills twice daily after that.    capsicum (Zostrix) 0.075 % topical cream Apply 2.5 mL / 1/2 teaspoon thinly to affected areas 2-3 times daily as needed.    celecoxib (CeleBREX) 200 mg capsule TAKE 1 CAPSULE BY  "MOUTH 2 TIMES A DAY WITH FOOD AS NEEDED FOR ARTHRITIS PAIN    cholecalciferol (Vitamin D-3) 1,250 mcg (50,000 unit) capsule 1 capsule, oral, See admin instructions, 1 PO every Sunday    cyanocobalamin (Vitamin B-12) 1,000 mcg/mL injection 1 mL, intramuscular, See admin instructions, Inject every 2 weeks    dapagliflozin propanediol (Farxiga) 10 mg 1 po qd    famotidine (PEPCID) 20 mg, oral, 2 times daily PRN    fenofibrate (Fenoglide) 120 mg tablet 1 po qd    magnesium oxide 500 mg tablet 1 po qd    omeprazole (PRILOSEC) 40 mg, oral, Daily before breakfast    ondansetron ODT (Zofran-ODT) 4 mg disintegrating tablet 1 tablet, oral, See admin instructions, Take 1 tablet every 4 to 6 hours as needed for nausea/vomiting    SITagliptin phosphate (Januvia) 50 mg tablet 1 by mouth daily    syringe with needle (BD Luer-Selma Syringe) 3 mL 25 gauge x 1\" syringe See admin instructions, Use every 2 weeks to inject 1ml of B12 intramuscularly every 2 weeks    tadalafil (CIALIS) 5 mg, oral, Daily        Objective   There were no vitals taken for this visit.     General: A&Ox3, NAD.  HEENT: AT/NC.   CV: RRR. No murmur.  Resp: CTA bilaterally. No wheezing, rhonchi or rales.   GI: Soft, NT/ND. BSx4.  Extrem: No edema. Pulses intact.  Skin: No Jaundice.   Neuro: No focal deficits.   Psych: Normal mood and affect.     Lab Results   Component Value Date    WBC 8.1 02/12/2024    HGB 13.9 02/12/2024    HCT 41.2 02/12/2024    MCV 88 02/12/2024     02/12/2024       Chemistry    Lab Results   Component Value Date/Time     02/12/2024 0816    K 4.1 02/12/2024 0816     02/12/2024 0816    CO2 25 02/12/2024 0816    BUN 18 02/12/2024 0816    CREATININE 1.13 02/12/2024 0816    Lab Results   Component Value Date/Time    CALCIUM 9.6 02/12/2024 0816    ALKPHOS 67 02/12/2024 0816    AST 12 02/12/2024 0816    ALT 19 02/12/2024 0816    BILITOT 0.4 02/12/2024 0816             ASSESSMENT/PLAN  Maury Haile Contreras is a 54 y.o. male with a " PMH of HTN, HLD, DM, obesity, GERD, testicular cancer, anxiety, tobacco abuse who presents to clinic for GERD.      Sean Alva, DO

## 2024-04-01 ENCOUNTER — APPOINTMENT (OUTPATIENT)
Dept: GASTROENTEROLOGY | Facility: CLINIC | Age: 54
End: 2024-04-01
Payer: COMMERCIAL

## 2024-05-20 ENCOUNTER — OFFICE VISIT (OUTPATIENT)
Dept: GASTROENTEROLOGY | Facility: CLINIC | Age: 54
End: 2024-05-20
Payer: COMMERCIAL

## 2024-05-20 ENCOUNTER — OFFICE VISIT (OUTPATIENT)
Dept: PRIMARY CARE | Facility: CLINIC | Age: 54
End: 2024-05-20
Payer: COMMERCIAL

## 2024-05-20 VITALS
WEIGHT: 195 LBS | HEART RATE: 74 BPM | SYSTOLIC BLOOD PRESSURE: 103 MMHG | DIASTOLIC BLOOD PRESSURE: 61 MMHG | RESPIRATION RATE: 18 BRPM | BODY MASS INDEX: 29.55 KG/M2 | HEIGHT: 68 IN | OXYGEN SATURATION: 95 %

## 2024-05-20 VITALS
HEIGHT: 68 IN | HEART RATE: 69 BPM | DIASTOLIC BLOOD PRESSURE: 71 MMHG | SYSTOLIC BLOOD PRESSURE: 123 MMHG | BODY MASS INDEX: 29.4 KG/M2 | WEIGHT: 194 LBS | OXYGEN SATURATION: 94 %

## 2024-05-20 DIAGNOSIS — E66.3 OVERWEIGHT: ICD-10-CM

## 2024-05-20 DIAGNOSIS — K62.5 RECTAL BLEEDING: ICD-10-CM

## 2024-05-20 DIAGNOSIS — M19.90 OSTEOARTHRITIS, UNSPECIFIED OSTEOARTHRITIS TYPE, UNSPECIFIED SITE: ICD-10-CM

## 2024-05-20 DIAGNOSIS — R21 RASH: ICD-10-CM

## 2024-05-20 DIAGNOSIS — F41.9 ANXIETY: Primary | ICD-10-CM

## 2024-05-20 DIAGNOSIS — I10 HYPERTENSION, UNSPECIFIED TYPE: ICD-10-CM

## 2024-05-20 DIAGNOSIS — E11.42 TYPE 2 DIABETES MELLITUS WITH DIABETIC POLYNEUROPATHY, WITHOUT LONG-TERM CURRENT USE OF INSULIN (MULTI): ICD-10-CM

## 2024-05-20 DIAGNOSIS — K21.9 GASTROESOPHAGEAL REFLUX DISEASE, UNSPECIFIED WHETHER ESOPHAGITIS PRESENT: ICD-10-CM

## 2024-05-20 PROBLEM — C62.90 MALIGNANT NEOPLASM OF TESTIS (MULTI): Status: RESOLVED | Noted: 2023-01-24 | Resolved: 2024-05-20

## 2024-05-20 LAB — POC HEMOGLOBIN A1C: 9.5 % (ref 4.2–6.5)

## 2024-05-20 PROCEDURE — 3078F DIAST BP <80 MM HG: CPT | Performed by: STUDENT IN AN ORGANIZED HEALTH CARE EDUCATION/TRAINING PROGRAM

## 2024-05-20 PROCEDURE — 3049F LDL-C 100-129 MG/DL: CPT | Performed by: FAMILY MEDICINE

## 2024-05-20 PROCEDURE — 3074F SYST BP LT 130 MM HG: CPT | Performed by: STUDENT IN AN ORGANIZED HEALTH CARE EDUCATION/TRAINING PROGRAM

## 2024-05-20 PROCEDURE — 99214 OFFICE O/P EST MOD 30 MIN: CPT | Performed by: STUDENT IN AN ORGANIZED HEALTH CARE EDUCATION/TRAINING PROGRAM

## 2024-05-20 PROCEDURE — 3074F SYST BP LT 130 MM HG: CPT | Performed by: FAMILY MEDICINE

## 2024-05-20 PROCEDURE — 3049F LDL-C 100-129 MG/DL: CPT | Performed by: STUDENT IN AN ORGANIZED HEALTH CARE EDUCATION/TRAINING PROGRAM

## 2024-05-20 PROCEDURE — 3051F HG A1C>EQUAL 7.0%<8.0%: CPT | Performed by: STUDENT IN AN ORGANIZED HEALTH CARE EDUCATION/TRAINING PROGRAM

## 2024-05-20 PROCEDURE — 83036 HEMOGLOBIN GLYCOSYLATED A1C: CPT | Performed by: FAMILY MEDICINE

## 2024-05-20 PROCEDURE — 3051F HG A1C>EQUAL 7.0%<8.0%: CPT | Performed by: FAMILY MEDICINE

## 2024-05-20 PROCEDURE — 3078F DIAST BP <80 MM HG: CPT | Performed by: FAMILY MEDICINE

## 2024-05-20 PROCEDURE — 99215 OFFICE O/P EST HI 40 MIN: CPT | Performed by: FAMILY MEDICINE

## 2024-05-20 RX ORDER — PIOGLITAZONEHYDROCHLORIDE 15 MG/1
15 TABLET ORAL DAILY
Qty: 90 TABLET | Refills: 3 | Status: SHIPPED | OUTPATIENT
Start: 2024-05-20 | End: 2024-05-20 | Stop reason: ENTERED-IN-ERROR

## 2024-05-20 NOTE — PROGRESS NOTES
"Subjective   Patient ID: Maury Contreras is a 54 y.o. male who presents for 3 month FU / IO A1c (Pt in today for routine 3 month FU / IO A1c).    Review of Systems  Denies N/V/D/C, no HA/S/V, denies rashes/lesions, no CP/SOB. Denies fevers/chills.  All other systems were negative.     Objective   /71 (BP Location: Left arm, Patient Position: Sitting)   Pulse 69   Ht 1.727 m (5' 8\")   Wt 88 kg (194 lb)   SpO2 94%   BMI 29.50 kg/m²     Physical Exam  Gen: NAD  eyes: EOMI, PERRLA  ENT: hearing grossly intact, no nasal discharge  resp: CTABL, without R/R  heart: RRR without MRG  GI: abd: S/ND/NT, BS+  lymph: no axillary, cervical, supraclavicular lymphadenopathy noted   MS: gait grossly WNL,  derm: no rashes or lesions noted  neuro: CN II-XII grossly intact  psych: A&Ox3    Assessment/Plan   Problem List Items Addressed This Visit             ICD-10-CM    Anxiety - Primary F41.9     Depression with anxiety. Agoraphobia. Had been doing well on BuSpar 15 mg twice daily. -->>  Will refill as needed.  Patient is also investigating starting magnesium oxide.          HTN (hypertension) I10     bp good today.  Usually very good to excellent numbers here. -->> CCT.         Overweight E66.3     bmi 29.  about same as last 2 appt. Down a lot from a max of 390. -->>  Keep up the excellent overall work.  Continue to avoid simple carbohydrates like bread, pasta, potatoes, rice, sugars etc.         Rectal bleeding K62.5     rectal bleeding at times, none recently.  Is seeing gastroenterology later today.  -->> Follow-up as planned.          Type 2 diabetes mellitus (Multi) E11.9     with diabetic neuropathy, A1c today 9.5, 7.6, was 7.6, 8.2, 8.2, 7.3, 7.2, 6.5, 6.5, 6.4, 6.0, 6.2, 6.5, 6.5, 6.3, 6.9 (oldest). H/O A1c's in the 14s.  Failed Rybelsus (abdominal pain).  On Farxiga 10 mg plus januvia 50mg. We discussed perhaps adding Actos but found that looks like Januvia was actually only 50 mg so far.  -->>  Increase " Januvia to 100 mg.  Will also refer to dietary/nutrition for education.  Recheck A1c in 3 months.  Also referring to podiatry.  Does have poor circulation.         Relevant Medications    SITagliptin phosphate (Januvia) 100 mg tablet    Other Relevant Orders    POCT glycosylated hemoglobin (Hb A1C) manually resulted (Completed)    Referral to Nutrition Services    Referral to Podiatry    Osteoarthritis M19.90     Osteoarthritis, neck, back, shoulders.  Been worse lately.  On Celebrex 200 mg twice daily. Has also been using a topical capsaicin product which he has found helpful.  Feeling the 200 mg Celebrex is doing better. -->>  Continue current treatment.  Also suggesting magnesium oxide or magnesium glycine 8 daily, probably 500 mg, to help lessen muscle spasm pain.         Rash R21    Relevant Orders    Referral to Dermatology            Patient Discussion/Summary      2025 will be next Preventative visit and [facility] and lab review.      <<<<<< Following portion of note not addressed today.  Will reformat into the new way at future appointments and address then.  ---------  Screening for colon cancer. -->>  Next colonoscopy due around April 2026.     Screen for hepatitis C was negative September 2022.     Screening for prostate cancer.  PSA 0.76, was 0.97, 1.0.  Patient does have family history of prostate cancer. -->> We will plan to check PSA annually.     Patient is due for annual flu shot.  Is up to date on coronavirus immunization, due for bivalent booster.  Is also due for shingles immunizations.  Had Tdap immunization in September 2021. -->>  Check with your pharmacy to keep up-to-date on your other immunizations.  ------    Cardiac CT calcium scoring was zero, over read was also negative.             Regarding previous labs: (annual due around November)      -Hyperlipidemia, HDL 43, was 34, 31, 26, 30, 32, 36, 35, goal 45 or more. , was NC, 99, N/C, 96, N/C, 116, 93, goal is 70 or less. , was  441, 598, 758, 341, 477, 168, 740. Historically as high as 13,000. Failed Crestor and gemfibrozil  (both gastrointestinal side).  Is now on fenofibrate 54 mg. -->>  We will use fenofibrate 220 mg.  Am working on getting sugar under control and we will readdress at that time.    - Vitamin D deficiency, 68, was 56, 32, 10, 10, 9, 8, 11, 20, 10, 9, 7, 16. Your goal is 75 - 100 or more.  Is on about 50,000 units twice a week.  Has the pills he got from [employer]. -->>  Continue current dosing.  We will recheck in November.    - Drug therapy, screening for conditions. -->> Recheck annual labs around november 2023.     - B12 deficiency: Has been on B12 shots.  Not necessarily not really noticed any benefit from them. -->>  Will try switching to the pills.  B12, 1000 mcg.  Take 1 daily.  Will check next time we do blood work.    Smoking, quit December 2023.  No longer needing Chantix. -->> Keep up the excellent work!      [facility]: Occasional N/V, hyperemesis. Dxed from [place] with [facility]. doing well in general using ondansetron/Zofran once a week.  Will refill as needed.    Erectile dysfunction:  Patient finds benefit using Cialis occasionally as needed. -->>  Will refill as needed.    >>>>>>      - We will return in about 3 months for in office ECG, A1c.

## 2024-05-20 NOTE — PATIENT INSTRUCTIONS
Problem List Items Addressed This Visit             ICD-10-CM    Anxiety - Primary F41.9     Depression with anxiety. Agoraphobia. Had been doing well on BuSpar 15 mg twice daily. -->>  Will refill as needed.  Patient is also investigating starting magnesium oxide.          HTN (hypertension) I10     bp good today.  Usually very good to excellent numbers here. -->> CCT.         Overweight E66.3     bmi 29.  about same as last 2 appt. Down a lot from a max of 390. -->>  Keep up the excellent overall work.  Continue to avoid simple carbohydrates like bread, pasta, potatoes, rice, sugars etc.         Rectal bleeding K62.5     rectal bleeding at times, none recently.  Is seeing gastroenterology later today.  -->> Follow-up as planned.          Type 2 diabetes mellitus (Multi) E11.9     with diabetic neuropathy, A1c today 9.5, 7.6, was 7.6, 8.2, 8.2, 7.3, 7.2, 6.5, 6.5, 6.4, 6.0, 6.2, 6.5, 6.5, 6.3, 6.9 (oldest). H/O A1c's in the 14s.  Failed Rybelsus (abdominal pain).  On Farxiga 10 mg plus januvia 50mg. We discussed perhaps adding Actos but found that looks like Januvia was actually only 50 mg so far.  -->>  Increase Januvia to 100 mg.  Will also refer to dietary/nutrition for education.  Recheck A1c in 3 months.  Also referring to podiatry.  Does have poor circulation.         Relevant Medications    SITagliptin phosphate (Januvia) 100 mg tablet    Other Relevant Orders    POCT glycosylated hemoglobin (Hb A1C) manually resulted (Completed)    Referral to Nutrition Services    Referral to Podiatry    Osteoarthritis M19.90     Osteoarthritis, neck, back, shoulders.  Been worse lately.  On Celebrex 200 mg twice daily. Has also been using a topical capsaicin product which he has found helpful.  Feeling the 200 mg Celebrex is doing better. -->>  Continue current treatment.  Also suggesting magnesium oxide or magnesium glycine 8 daily, probably 500 mg, to help lessen muscle spasm pain.         Rash R21    Relevant Orders     Referral to Dermatology         - We will return in about 3 months for in office ECG, A1c.

## 2024-05-20 NOTE — ASSESSMENT & PLAN NOTE
Pityriasis rosacea is highest in the differential.  Very diffuse rash slightly raised in some locations.  Small rings noted in other locations.  Bilateral upper and lower extremities and torso.  Not itchy.  History of xanthomas. -->>  Refer to dermatology for evaluation and treatment as well as annual skin cancer screenings.

## 2024-05-20 NOTE — ASSESSMENT & PLAN NOTE
Osteoarthritis, neck, back, shoulders.  Been worse lately.  On Celebrex 200 mg twice daily. Has also been using a topical capsaicin product which he has found helpful.  Feeling the 200 mg Celebrex is doing better. -->>  Continue current treatment.  Also suggesting magnesium oxide or magnesium glycine 8 daily, probably 500 mg, to help lessen muscle spasm pain.

## 2024-05-20 NOTE — PROGRESS NOTES
CC: GERD + rectal bleeding ( 2 episodes).     History of Present Illness:   Maury Contreras is a 54 y.o. male with a PMH of HTN, HLD, DMII (last A1c: 9.5), GERD, anxiety, who presents to clinic for GERD.  Patient is on Prilosec 40 mg daily.  Does not take it around mealtime.  Gets occasional breakthrough symptoms.  Wants an EGD.  Recent A1c of 9.5.  Motivated to get back into the gym and make lifestyle changes.  Rectal bleeding has happened twice in the last year (minimal).  No other GI complaints at this time.  Drinks 6 to 10 cups of coffee daily.  Does use ClearLax daily.  Does not take any fiber supplement.    Colonoscopy 2023: Normal TI, 5 polyps, diverticulosis, hemorrhoids.     Review of Systems  ROS Negative unless otherwise stated above.    Past Medical/Surgical History  Past Medical History:   Diagnosis Date    Malignant neoplasm of testis (Multi) 01/24/2023    Muscle weakness (generalized)     Localized muscle weakness    Personal history of other diseases of the musculoskeletal system and connective tissue     History of arthritis    Personal history of other endocrine, nutritional and metabolic disease     History of diabetes mellitus    Unspecified lesions of oral mucosa     Mouth lesion      Past Surgical History:   Procedure Laterality Date    CHOLECYSTECTOMY  12/06/2017    Cholecystectomy Laparoscopic    COLONOSCOPY  04/19/2018    Complete Colonoscopy    ESOPHAGOGASTRODUODENOSCOPY  04/19/2018    Diagnostic Esophagogastroduodenoscopy    MOUTH SURGERY  08/23/2016    Oral Surgery Tooth Extraction    OTHER SURGICAL HISTORY  09/22/2017    Surgery Testis Orchiectomy        Social History   reports that he has been smoking cigarettes. He has quit using smokeless tobacco.     Family History  family history includes Colon cancer in his cousin; Coronary artery disease in his father and mother; Diabetes in his father's brother and father's sister.     Allergies  No Known Allergies    Medications  Current  "Outpatient Medications   Medication Instructions    albuterol (Ventolin HFA) 90 mcg/actuation inhaler 2 puffs, inhalation, Every 4 hours PRN    busPIRone (Buspar) 10 mg tablet 1 by mouth 3 times daily for at least 10 to 14 days.  If desired could increase to 2 pills twice daily after that.    capsicum (Zostrix) 0.075 % topical cream Apply 2.5 mL / 1/2 teaspoon thinly to affected areas 2-3 times daily as needed.    celecoxib (CeleBREX) 200 mg capsule TAKE 1 CAPSULE BY MOUTH 2 TIMES A DAY WITH FOOD AS NEEDED FOR ARTHRITIS PAIN    cholecalciferol (Vitamin D-3) 1,250 mcg (50,000 unit) capsule 1 capsule, oral, See admin instructions, 1 PO every Sunday    cyanocobalamin (Vitamin B-12) 1,000 mcg/mL injection 1 mL, intramuscular, See admin instructions, Inject every 2 weeks    dapagliflozin propanediol (Farxiga) 10 mg 1 po qd    famotidine (PEPCID) 20 mg, oral, 2 times daily PRN    fenofibrate (Fenoglide) 120 mg tablet 1 po qd    magnesium oxide 500 mg tablet 1 po qd    omeprazole (PRILOSEC) 40 mg, oral, Daily before breakfast    ondansetron ODT (Zofran-ODT) 4 mg disintegrating tablet 1 tablet, oral, See admin instructions, Take 1 tablet every 4 to 6 hours as needed for nausea/vomiting    SITagliptin phosphate (Januvia) 100 mg tablet 1 by mouth daily    syringe with needle (BD Luer-Selma Syringe) 3 mL 25 gauge x 1\" syringe See admin instructions, Use every 2 weeks to inject 1ml of B12 intramuscularly every 2 weeks    tadalafil (CIALIS) 5 mg, oral, Daily        Objective   Visit Vitals  /61   Pulse 74   Resp 18        General: A&Ox3, NAD.  HEENT: AT/NC.   CV: RRR. No murmur.  Resp: CTA bilaterally. No wheezing, rhonchi or rales.   GI: Soft, NT/ND.   Extrem: No edema.   Skin: No Jaundice.   Neuro: No focal deficits.   Psych: Normal mood and affect.     Lab Results   Component Value Date    WBC 8.1 02/12/2024    HGB 13.9 02/12/2024    HCT 41.2 02/12/2024    MCV 88 02/12/2024     02/12/2024       Chemistry    Lab " Results   Component Value Date/Time     02/12/2024 0816    K 4.1 02/12/2024 0816     02/12/2024 0816    CO2 25 02/12/2024 0816    BUN 18 02/12/2024 0816    CREATININE 1.13 02/12/2024 0816    Lab Results   Component Value Date/Time    CALCIUM 9.6 02/12/2024 0816    ALKPHOS 67 02/12/2024 0816    AST 12 02/12/2024 0816    ALT 19 02/12/2024 0816    BILITOT 0.4 02/12/2024 0816             ASSESSMENT/PLAN  Maury Contreras is a 54 y.o. male with a PMH of HTN, HLD, DMII (last A1c: 9.5), GERD, anxiety, who presents to clinic for GERD.  + Rectal bleeding    -Continue omeprazole (advised on proper timing).  -Avoid NSAIDs.  -Antireflux lifestyle.  Must reduce caffeine/coffee intake.  -Must improve A1c.  -Obtain EGD.    Rectal bleeding  -Start fiber supplementation and titrate as needed.  -Due for surveillance colonoscopy in 2026.    Sean Alva, DO

## 2024-05-20 NOTE — ASSESSMENT & PLAN NOTE
rectal bleeding at times, none recently.  Is seeing gastroenterology later today.  -->> Follow-up as planned.

## 2024-05-20 NOTE — ASSESSMENT & PLAN NOTE
with diabetic neuropathy, A1c today 9.5, 7.6, was 7.6, 8.2, 8.2, 7.3, 7.2, 6.5, 6.5, 6.4, 6.0, 6.2, 6.5, 6.5, 6.3, 6.9 (oldest). H/O A1c's in the 14s.  Failed Rybelsus (abdominal pain).  On Farxiga 10 mg plus januvia 50mg. We discussed perhaps adding Actos but found that looks like Januvia was actually only 50 mg so far.  -->>  Increase Januvia to 100 mg.  Will also refer to dietary/nutrition for education.  Recheck A1c in 3 months.  Also referring to podiatry.  Does have poor circulation.

## 2024-05-20 NOTE — ASSESSMENT & PLAN NOTE
Depression with anxiety. Agoraphobia. Had been doing well on BuSpar 15 mg twice daily. -->>  Will refill as needed.  Patient is also investigating starting magnesium oxide.

## 2024-05-20 NOTE — ASSESSMENT & PLAN NOTE
bmi 29.  about same as last 2 appt. Down a lot from a max of 390. -->>  Keep up the excellent overall work.  Continue to avoid simple carbohydrates like bread, pasta, potatoes, rice, sugars etc.

## 2024-05-31 ENCOUNTER — TELEPHONE (OUTPATIENT)
Dept: GASTROENTEROLOGY | Facility: CLINIC | Age: 54
End: 2024-05-31
Payer: COMMERCIAL

## 2024-06-11 ENCOUNTER — OFFICE VISIT (OUTPATIENT)
Dept: PODIATRY | Facility: CLINIC | Age: 54
End: 2024-06-11
Payer: COMMERCIAL

## 2024-06-11 VITALS
WEIGHT: 194 LBS | BODY MASS INDEX: 29.4 KG/M2 | TEMPERATURE: 96.5 F | SYSTOLIC BLOOD PRESSURE: 124 MMHG | HEIGHT: 68 IN | OXYGEN SATURATION: 96 % | DIASTOLIC BLOOD PRESSURE: 73 MMHG | HEART RATE: 68 BPM

## 2024-06-11 DIAGNOSIS — E11.8 DIABETIC FOOT (MULTI): Primary | ICD-10-CM

## 2024-06-11 DIAGNOSIS — M79.674 PAIN IN TOES OF BOTH FEET: ICD-10-CM

## 2024-06-11 DIAGNOSIS — M79.675 PAIN IN TOES OF BOTH FEET: ICD-10-CM

## 2024-06-11 DIAGNOSIS — E11.42 TYPE 2 DIABETES MELLITUS WITH DIABETIC POLYNEUROPATHY, WITHOUT LONG-TERM CURRENT USE OF INSULIN (MULTI): ICD-10-CM

## 2024-06-11 DIAGNOSIS — B35.1 ONYCHOMYCOSIS: ICD-10-CM

## 2024-06-11 ASSESSMENT — PATIENT HEALTH QUESTIONNAIRE - PHQ9
SUM OF ALL RESPONSES TO PHQ9 QUESTIONS 1 AND 2: 0
2. FEELING DOWN, DEPRESSED OR HOPELESS: NOT AT ALL
1. LITTLE INTEREST OR PLEASURE IN DOING THINGS: NOT AT ALL

## 2024-06-11 NOTE — PROGRESS NOTES
Initial Podiatric Office Visit:    Chief Complaint:   Chief Complaint   Patient presents with    DM Foot Care           HPI:  This 54 y.o. male with PMH indicated below presents complaining of elongated painful toenails that patient is unable to cut.  He states that the nails from pressure in his shoes which causes tenderness with walking.  Patient denies using any over-the-counter oral medications for dystrophic nails.  Patients last HgA1c last was 9.5 on 5/10/24.   Admits to decrease sensation to the feet b/l.  He denies any constitutional symptoms at this time.  No other pedal complaints.        PCP:  Art Blair, DO:    [facility]  Past Medical History:   Diagnosis Date    Malignant neoplasm of testis (Multi) 01/24/2023    Muscle weakness (generalized)     Localized muscle weakness    Personal history of other diseases of the musculoskeletal system and connective tissue     History of arthritis    Personal history of other endocrine, nutritional and metabolic disease     History of diabetes mellitus    Unspecified lesions of oral mucosa     Mouth lesion   :    MEDICATIONS  Current Outpatient Medications   Medication Sig Dispense Refill    busPIRone (Buspar) 10 mg tablet 1 by mouth 3 times daily for at least 10 to 14 days.  If desired could increase to 2 pills twice daily after that. 360 tablet 1    capsicum (Zostrix) 0.075 % topical cream Apply 2.5 mL / 1/2 teaspoon thinly to affected areas 2-3 times daily as needed. 120 g 3    celecoxib (CeleBREX) 200 mg capsule TAKE 1 CAPSULE BY MOUTH 2 TIMES A DAY WITH FOOD AS NEEDED FOR ARTHRITIS PAIN 180 capsule 1    cholecalciferol (Vitamin D-3) 1,250 mcg (50,000 unit) capsule Take 1 capsule (50,000 Units) by mouth see administration instructions. 1 PO every Sunday      cyanocobalamin (Vitamin B-12) 1,000 mcg/mL injection Inject 1 mL (1,000 mcg) into the muscle see administration instructions. Inject every 2 weeks      dapagliflozin propanediol (Farxiga) 10 mg 1 po qd 90  "tablet 3    famotidine (Pepcid) 20 mg tablet Take 1 tablet (20 mg) by mouth 2 times a day as needed for heartburn. 90 tablet 1    fenofibrate (Fenoglide) 120 mg tablet 1 po qd 90 tablet 3    magnesium oxide 500 mg tablet 1 po qd 90 tablet 3    omeprazole (PriLOSEC) 40 mg DR capsule Take 1 capsule (40 mg) by mouth once daily in the morning. Take before meals. 90 capsule 1    ondansetron ODT (Zofran-ODT) 4 mg disintegrating tablet Take 1 tablet (4 mg) by mouth see administration instructions. Take 1 tablet every 4 to 6 hours as needed for nausea/vomiting      SITagliptin phosphate (Januvia) 100 mg tablet 1 by mouth daily 90 tablet 3    tadalafil (Cialis) 5 mg tablet Take 1 tablet (5 mg) by mouth once daily. 90 tablet 3    albuterol (Ventolin HFA) 90 mcg/actuation inhaler Inhale 2 puffs every 4 hours if needed for wheezing or shortness of breath. 8 g 5    syringe with needle (BD Luer-Selma Syringe) 3 mL 25 gauge x 1\" syringe See administration instructions. Use every 2 weeks to inject 1ml of B12 intramuscularly every 2 weeks       No current facility-administered medications for this visit.   :  No Known Allergies:  Past Surgical History:   Procedure Laterality Date    CHOLECYSTECTOMY  12/06/2017    Cholecystectomy Laparoscopic    COLONOSCOPY  04/19/2018    Complete Colonoscopy    ESOPHAGOGASTRODUODENOSCOPY  04/19/2018    Diagnostic Esophagogastroduodenoscopy    MOUTH SURGERY  08/23/2016    Oral Surgery Tooth Extraction    OTHER SURGICAL HISTORY  09/22/2017    Surgery Testis Orchiectomy     Family History   Problem Relation Name Age of Onset    Coronary artery disease Mother      Coronary artery disease Father      Diabetes Father's Sister      Diabetes Father's Brother      Colon cancer Cousin      Lung cancer Neg Hx     :  Social History     Socioeconomic History    Marital status:      Spouse name: None    Number of children: None    Years of education: None    Highest education level: None   Occupational " History    None   Tobacco Use    Smoking status: Former     Types: Cigarettes    Smokeless tobacco: Never   Substance and Sexual Activity    Alcohol use: Never    Drug use: Yes     Types: Marijuana    Sexual activity: None   Other Topics Concern    None   Social History Narrative    None     Social Determinants of Health     Financial Resource Strain: Not on file   Food Insecurity: Not on file   Transportation Needs: Not on file   Physical Activity: Not on file   Stress: Not on file   Social Connections: Not on file   Intimate Partner Violence: Not on file   Housing Stability: Not on file       REVIEW OF SYSTEMS    GENERAL: No weight loss, malaise or fevers.  HEENT: Negative for frequent or significant headaches,   RESPIRATORY: Negative for cough, wheezing or shortness of breath.  CARDIOVASCULAR: Negative for chest pain, leg swelling or palpitations.  GI: Negative for abdominal discomfort,  MUSCULOSKELETAL: denies back pain      SKIN: Negative for lesions, rash, and itching.  NEURO: positive numbess, tingling or burning in feet      Physical Exam:       Patient is alert and oriented x 3 in NAD    Vascular:   2/4 Palpable Dorsalis Pedis and Posterior Tibial Pulses B/L  Capillary Fill time < 3 seconds to digits 1-5 B/L  Skin temperature warm to warm tibial tuberosity to the digits B/L  Mild pedal edema.  Mild  varicosities    Neurological:   Light touch intact with significant decrease in protective sensation noted.  Proprioception noted to be intact.    Dermatological:   Skin appears xerotic with webspaces bilaterally clean dry and intact.  Nails intact bilaterally 1 through 5 are dystrophic, discolored, elongated with minimal debris.  No open wounds or hyperkeratotic tissue noted.    Musculoskeletal/Orthopaedic:   +5/5 muscle strength Dorsiflexion, Plantarflexion, Inversion, Eversion B/L  ROM of the 1st MTPJ is decreased without pain or crepitus b/l.  ROM of the MTJ/STJ is full without pain or crepitus b/l.  Ankle  joint ROM is decreased  B/L.  Tenderness to palpation of digits 1 through 5 bilaterally.        ASSESSMENT:   1. Diabetic foot (Multi)    2. Type 2 diabetes mellitus with diabetic polyneuropathy, without long-term current use of insulin (Multi)    3. Onychomycosis    4. Pain in toes of both feet          Plan:    - Initial Office Visit   - Etiology and treatment options were discussed with the patient.  -Patient examined and evaluated  - Discussed with patient topical creams for dryness of skin.  - Nails 1 through 5 are sharply debrided in thickness and length without incident.  - Discussed with patient routine pedal care as well as daily pedal checks due to neuropathy.  - Patient to follow-up in 10 weeks    Sia Haynes DPM      A total of 30 minutes was spent in formulation of this note, review of charts, labs,  treatment and coordination of care, with a minimum of 50% of the time spent in face to face with with the patient.  All questions and concerns were answered to the patients satisfaction.     Off note, use of vocal Dragon dictation system was used to dictate this document.  All proper spelling and grammatical errors may not have been corrected prior to final submission.

## 2024-06-12 RX ORDER — UBIDECARENONE 75 MG
500 CAPSULE ORAL DAILY
COMMUNITY

## 2024-06-13 ENCOUNTER — ANESTHESIA EVENT (OUTPATIENT)
Dept: GASTROENTEROLOGY | Facility: HOSPITAL | Age: 54
End: 2024-06-13
Payer: COMMERCIAL

## 2024-06-13 SDOH — HEALTH STABILITY: MENTAL HEALTH: CURRENT SMOKER: 0

## 2024-06-14 ENCOUNTER — HOSPITAL ENCOUNTER (OUTPATIENT)
Dept: GASTROENTEROLOGY | Facility: HOSPITAL | Age: 54
Setting detail: OUTPATIENT SURGERY
Discharge: HOME | End: 2024-06-14
Payer: COMMERCIAL

## 2024-06-14 ENCOUNTER — ANESTHESIA (OUTPATIENT)
Dept: GASTROENTEROLOGY | Facility: HOSPITAL | Age: 54
End: 2024-06-14
Payer: COMMERCIAL

## 2024-06-14 VITALS
RESPIRATION RATE: 16 BRPM | HEART RATE: 65 BPM | HEIGHT: 68 IN | WEIGHT: 190.48 LBS | BODY MASS INDEX: 28.87 KG/M2 | DIASTOLIC BLOOD PRESSURE: 76 MMHG | OXYGEN SATURATION: 96 % | TEMPERATURE: 97.3 F | SYSTOLIC BLOOD PRESSURE: 121 MMHG

## 2024-06-14 DIAGNOSIS — K21.9 GASTROESOPHAGEAL REFLUX DISEASE, UNSPECIFIED WHETHER ESOPHAGITIS PRESENT: ICD-10-CM

## 2024-06-14 LAB — GLUCOSE BLD MANUAL STRIP-MCNC: 253 MG/DL (ref 74–99)

## 2024-06-14 PROCEDURE — 7100000010 HC PHASE TWO TIME - EACH INCREMENTAL 1 MINUTE

## 2024-06-14 PROCEDURE — 3700000002 HC GENERAL ANESTHESIA TIME - EACH INCREMENTAL 1 MINUTE

## 2024-06-14 PROCEDURE — 82947 ASSAY GLUCOSE BLOOD QUANT: CPT

## 2024-06-14 PROCEDURE — 2500000004 HC RX 250 GENERAL PHARMACY W/ HCPCS (ALT 636 FOR OP/ED): Performed by: STUDENT IN AN ORGANIZED HEALTH CARE EDUCATION/TRAINING PROGRAM

## 2024-06-14 PROCEDURE — 43239 EGD BIOPSY SINGLE/MULTIPLE: CPT | Performed by: STUDENT IN AN ORGANIZED HEALTH CARE EDUCATION/TRAINING PROGRAM

## 2024-06-14 PROCEDURE — 7100000009 HC PHASE TWO TIME - INITIAL BASE CHARGE

## 2024-06-14 PROCEDURE — 2500000004 HC RX 250 GENERAL PHARMACY W/ HCPCS (ALT 636 FOR OP/ED): Performed by: NURSE ANESTHETIST, CERTIFIED REGISTERED

## 2024-06-14 PROCEDURE — 3700000001 HC GENERAL ANESTHESIA TIME - INITIAL BASE CHARGE

## 2024-06-14 RX ORDER — PROPOFOL 10 MG/ML
INJECTION, EMULSION INTRAVENOUS AS NEEDED
Status: DISCONTINUED | OUTPATIENT
Start: 2024-06-14 | End: 2024-06-14

## 2024-06-14 RX ORDER — SODIUM CHLORIDE, SODIUM LACTATE, POTASSIUM CHLORIDE, CALCIUM CHLORIDE 600; 310; 30; 20 MG/100ML; MG/100ML; MG/100ML; MG/100ML
20 INJECTION, SOLUTION INTRAVENOUS CONTINUOUS
Status: DISCONTINUED | OUTPATIENT
Start: 2024-06-14 | End: 2024-06-15 | Stop reason: HOSPADM

## 2024-06-14 ASSESSMENT — PAIN SCALES - GENERAL
PAIN_LEVEL: 0
PAINLEVEL_OUTOF10: 0 - NO PAIN

## 2024-06-14 ASSESSMENT — COLUMBIA-SUICIDE SEVERITY RATING SCALE - C-SSRS
1. IN THE PAST MONTH, HAVE YOU WISHED YOU WERE DEAD OR WISHED YOU COULD GO TO SLEEP AND NOT WAKE UP?: NO
2. HAVE YOU ACTUALLY HAD ANY THOUGHTS OF KILLING YOURSELF?: NO
6. HAVE YOU EVER DONE ANYTHING, STARTED TO DO ANYTHING, OR PREPARED TO DO ANYTHING TO END YOUR LIFE?: NO

## 2024-06-14 ASSESSMENT — PAIN - FUNCTIONAL ASSESSMENT
PAIN_FUNCTIONAL_ASSESSMENT: 0-10

## 2024-06-14 NOTE — H&P
Outpatient Hospital Procedure H&P    Patient Profile  Name Maury Contreras  Date of Birth 1970  MRN [id number]  PCP Art Blair        Diagnosis: GERD.  Procedure(s):  EGD.    Allergies  No Known Allergies    Past Medical History   Past Medical History:   Diagnosis Date    Abdominal pain     Anxiety     Arthritis     Cholelithiasis     Constipation     COVID-19     VACCINATED    Diabetic polyneuropathy (Multi)     GERD (gastroesophageal reflux disease)     Hypertension     Joint pain     Larynx neoplasm     Malignant neoplasm of testis (Multi) 01/24/2023    Muscle weakness (generalized)     Localized muscle weakness    Personal history of other diseases of the musculoskeletal system and connective tissue     History of arthritis    Personal history of other endocrine, nutritional and metabolic disease     History of diabetes mellitus    Rectal bleeding     Type 2 diabetes mellitus (Multi)     Unspecified lesions of oral mucosa     Mouth lesion    Vertigo     Wears glasses        Medication Reviewed - yes  Prior to Admission medications    Medication Sig Start Date End Date Taking? Authorizing Provider   busPIRone (Buspar) 10 mg tablet 1 by mouth 3 times daily for at least 10 to 14 days.  If desired could increase to 2 pills twice daily after that. 11/20/23  Yes Art Blair DO   celecoxib (CeleBREX) 200 mg capsule TAKE 1 CAPSULE BY MOUTH 2 TIMES A DAY WITH FOOD AS NEEDED FOR ARTHRITIS PAIN 2/19/24  Yes Art Blair DO   cholecalciferol (Vitamin D-3) 1,250 mcg (50,000 unit) capsule Take 1 capsule (50,000 Units) by mouth see administration instructions. 1 PO every Sunday 9/23/21  Yes Historical Provider, MD   cyanocobalamin (Vitamin B-12) 500 mcg tablet Take 1 tablet (500 mcg) by mouth once daily.   Yes Historical Provider, MD   famotidine (Pepcid) 20 mg tablet Take 1 tablet (20 mg) by mouth 2 times a day as needed for heartburn. 2/12/24  Yes Art Blair DO   fenofibrate (Fenoglide) 120  "mg tablet 1 po qd 2/19/24  Yes Art Blair, DO   magnesium oxide 500 mg tablet 1 po qd 2/19/24  Yes Art Blair, DO   omeprazole (PriLOSEC) 40 mg DR capsule Take 1 capsule (40 mg) by mouth once daily in the morning. Take before meals. 2/12/24  Yes Art Blair, DO   SITagliptin phosphate (Januvia) 100 mg tablet 1 by mouth daily 5/20/24  Yes Art Blair, DO   capsicum (Zostrix) 0.075 % topical cream Apply 2.5 mL / 1/2 teaspoon thinly to affected areas 2-3 times daily as needed.  Patient not taking: Reported on 6/14/2024 11/20/23   Art Blair DO   dapagliflozin propanediol (Farxiga) 10 mg 1 po qd 8/24/23   Art Blair, DO   ondansetron ODT (Zofran-ODT) 4 mg disintegrating tablet Take 1 tablet (4 mg) by mouth see administration instructions. Take 1 tablet every 4 to 6 hours as needed for nausea/vomiting 6/7/17   Historical Provider, MD   syringe with needle (BD Luer-Selma Syringe) 3 mL 25 gauge x 1\" syringe See administration instructions. Use every 2 weeks to inject 1ml of B12 intramuscularly every 2 weeks    Historical Provider, MD   tadalafil (Cialis) 5 mg tablet Take 1 tablet (5 mg) by mouth once daily.  Patient not taking: Reported on 6/14/2024 2/19/24   Art Blair DO   albuterol (Ventolin HFA) 90 mcg/actuation inhaler Inhale 2 puffs every 4 hours if needed for wheezing or shortness of breath. 3/9/23 6/12/24  Art Blair, DO   cyanocobalamin (Vitamin B-12) 1,000 mcg/mL injection Inject 1 mL (1,000 mcg) into the muscle see administration instructions. Inject every 2 weeks 9/8/22 6/12/24  Historical Provider, MD       Physical Exam  Vitals:    06/14/24 0640   BP: 137/74   Pulse: 62   Resp: 16   Temp: 36.4 °C (97.5 °F)   SpO2: 98%      Weight   Vitals:    06/14/24 0640   Weight: 86.4 kg (190 lb 7.6 oz)     BMI Body mass index is 28.96 kg/m².    General: A&Ox3, NAD.  HEENT: AT/NC.   CV: RRR.   Resp: CTA bilaterally. No wheezing, rhonchi or rales.   GI: Soft, NT/ND.   Extrem: No " edema.   Skin: No Jaundice.   Neuro: No focal deficits.   Psych: Normal mood and affect.        Sedation Plan: Deep Sedation.  Procedure Plan - pre-procedural (re)assesment completed by physician:  discharge/transfer patient when discharge criteria met    Sean Alva DO  6/14/2024 7:15 AM

## 2024-06-14 NOTE — Clinical Note
Huddle and Timeout completed together with team. Patient wristband and WILNER information verified.  Anesthesia safety check completed

## 2024-06-14 NOTE — DISCHARGE INSTRUCTIONS
Moderate Sedation in Adults Discharge Instructions    About this topic  Moderate sedation is also known as conscious sedation. It changes your state of being awake or consciousness. With this sedation, you may feel slight pain or pressure during a procedure. The drugs help you to relax and may even allow you to sleep. It will be easy to wake you and you may talk and answer questions while under sedation. Most likely, you will not remember what happens while under this sedation.  What care is needed at home?  Ask your doctor what you need to do when you go home. Make sure you understand everything the doctor says. This way you will know what you need to do.  You will not be allowed to drive right away after the procedure. Ask a family member or a friend to drive you home.  Do not operate heavy or dangerous machinery for at least 24 hours.  Do not make major decisions or sign important papers for at least 24 hours. You may not be thinking clearly.  Avoid beer, wine, or mixed drinks (alcohol) for at least 24 hours.  You are at a higher risk of falling for at least 24 hours after moderate sedation.  Take extra care when you get up.  Do not change positions quickly.  Do not rush when you need to go to the bathroom or to answer the phone.  Ask for help if you feel unsteady when you try to walk.  Wear shoes with non-slip soles and low heels.  What follow-up care is needed?  Your doctor may ask you to make visits to the office to check on your progress. Be sure to keep these visits. Your doctor may also refer you to other doctors or tell you that you need more tests or care.  What drugs may be needed?  The doctor may order drugs to:  Help with pain  Treat an upset stomach or throwing up  Will physical activity be limited?  Rest for the day of the procedure. Avoid strenuous activities like heavy lifting and hard exercise. Talk to your doctor about whether you need to limit lifting or exercise after your procedure.  What  changes to diet are needed?  Start with a light diet when you are fully awake. This includes things that are easy to swallow like soups, pudding, jello, toast, and eggs. Slowly progress to your normal diet.  What problems could happen?  Low blood pressure  Breathing problems  Upset stomach or throwing up  Dizziness  When do I need to call the doctor?  Feel dizzy, weak, or tired  Faint  Very bad headache  Upset stomach or throwing up  To follow up for more tests or care  Teach Back: Helping You Understand  The Teach Back Method helps you understand the information we are giving you. After you talk with the staff, tell them in your own words what you learned. This helps to make sure the staff has described each thing clearly. It also helps to explain things that may have been confusing. Before going home, make sure you can do these:  I can tell you about my procedure.  I can tell you if I need more tests or care.  I can tell you what is good for me to eat and drink the next day.  I can tell you what I would do if I feel dizzy, weak, or tired.  Last Reviewed Date  2020-03-02     Upper GI Endoscopy Discharge Instructions    About this topic  This procedure is done to view your upper gastrointestinal (GI) tract. This includes your throat and food pipe (esophagus). It also includes your stomach and the first part of the small bowel. Some people have this test for problems like coughing or throwing up blood. Other people may be having bad belly pain or blood in their stool. You may be having trouble swallowing or problems with acid reflux.  Doctors often use this test to look for problems like:  Ulcers  Cancer or tumor growths  Internal bleeding  Swelling  Inflammation  Infection  Maurer's esophagus  Gastroesophageal reflux disease or GERD  Swallowing problems    What care is needed at home?  Ask your doctor what you need to do when you go home. Make sure you ask questions if you do not understand what the doctor says.  This way you will know what you need to do.  Your throat may feel sore. Your belly may also feel bloated. Your doctor may give you drugs to help with pain.  Talk to your doctor about when it is safe for you to go back to eating your normal diet and taking your drugs. You can drink fluid once the numbing drugs in your throat wear off.  What follow-up care is needed?  Your doctor may ask you to make visits to the office to check on your progress. Be sure to keep these visits.  The results of this test may help your doctor understand what kind of problem you have with your upper GI tract. Together you can make a plan for more care.  What drugs may be needed?  The doctor may order drugs to:  Help with pain  Decrease the acid in your stomach  Will physical activity be limited?  You may need to limit your activity for the rest of the day. After that, you will likely be able to go back to your normal activities.  What changes to diet are needed?  Soft foods like pudding or soups may be easier to eat at first. Ask your doctor if you need to make any changes to your diet.  What problems could happen?  Painful swallowing  Upset stomach  Injury to food pipe  Throwing up  Tear in the esophagus  When do I need to call the doctor?  Signs of infection. These include a fever of 100.4°F (38°C) or higher, chills.  Very bad belly pain  Throwing up blood  Your belly is hard and swollen  Trouble swallowing or breathing  Upset stomach and throwing up  Bloody or black tarry stools  Cough, shortness of breath, or chest pain  A crunching feeling under the skin in your neck  You are not feeling better in 2 to 3 days or you are feeling worse  Teach Back: Helping You Understand  The Teach Back Method helps you understand the information we are giving you. After you talk with the staff, tell them in your own words what you learned. This helps to make sure the staff has described each thing clearly. It also helps to explain things that may have  been confusing. Before going home, make sure you can do these:  I can tell you about my procedure.  I can tell you what changes I need to make with my diet or drugs.  I can tell you what I will do if I have very bad belly pain, throwing up blood, or my belly is hard and swollen.  Where can I learn more?  [employer]  https://www.gastro.org/practice-guidance/gi-patient-center/topic/upper-gi-endoscopy  Last Reviewed Date

## 2024-06-14 NOTE — ANESTHESIA POSTPROCEDURE EVALUATION
Patient: Maury Contreras    Procedure Summary       Date: 06/14/24 Room / Location: [facility]    Anesthesia Start: 0729 Anesthesia Stop: 0740    Procedure: EGD Diagnosis: Gastroesophageal reflux disease, unspecified whether esophagitis present    Scheduled Providers: Sean Alva DO; Hi Boudreaux MD; Mak Gregory RN; Soha Leonardo Responsible Provider: Hi Boudreaux MD    Anesthesia Type: MAC ASA Status: 3            Anesthesia Type: MAC    Vitals Value Taken Time   /70 06/14/24 0740   Temp 36.5 06/14/24 0740   Pulse 62 06/14/24 0740   Resp 18 06/14/24 0740   SpO2 100 06/14/24 0740       Anesthesia Post Evaluation    Patient location during evaluation: PACU  Patient participation: complete - patient participated  Level of consciousness: awake  Pain score: 0  Pain management: adequate  Airway patency: patent  Cardiovascular status: acceptable and stable  Respiratory status: acceptable  Hydration status: acceptable  Postoperative Nausea and Vomiting: none      No notable events documented.    
Yes

## 2024-06-14 NOTE — ANESTHESIA PREPROCEDURE EVALUATION
Patient: Maury Contreras    Procedure Information       Date/Time: 06/14/24 0730    Scheduled providers: Sean Alva DO; Hi Boudreaux MD    Procedure: EGD    Location: [facility]            Relevant Problems   Cardiac   (+) HTN (hypertension)   (+) Hyperlipidemia   (+) Hypertriglyceridemia      Neuro   (+) Anxiety   (+) Carpal tunnel syndrome   (+) Cervical radiculopathy, chronic      GI   (+) GERD (gastroesophageal reflux disease)   (+) Rectal bleeding      Liver   (+) Gallstones      Endocrine   (+) Diabetic neuropathy (Multi)   (+) Type 2 diabetes mellitus (Multi)      Musculoskeletal   (+) Carpal tunnel syndrome   (+) Degenerative cervical spinal stenosis   (+) Osteoarthritis      ID   (+) Acute bacterial conjunctivitis of left eye      Skin   (+) Rash       Clinical information reviewed:                   NPO Detail:  No data recorded     Physical Exam    Airway  Mallampati: II  TM distance: >3 FB     Cardiovascular    Dental - normal exam     Pulmonary    Abdominal          Anesthesia Plan    History of general anesthesia?: yes  History of complications of general anesthesia?: no    ASA 3     MAC     The patient is not a current smoker.    intravenous induction   Anesthetic plan and risks discussed with patient.    Plan discussed with CRNA.

## 2024-06-14 NOTE — Clinical Note
Patient tolerated procedure well. Appears comfortable with no complaints of pain. VS stable. Arousable prior to transport. Patient transported to [facility] via cart.  Report called per CRNA.  Handoff completed.

## 2024-06-25 NOTE — PROGRESS NOTES
Reason for Nutrition Visit:  Pt is a 54 y.o. male being seen in [place] referred for   1. Type 2 diabetes mellitus with diabetic polyneuropathy, without long-term current use of insulin (Multi)  Referral to Nutrition Services         Past Medical Hx:  Patient Active Problem List   Diagnosis    Abdominal pain of multiple sites    Abdominal pain, epigastric    Abscess, perianal    Abscess of knee, left    Acute bacterial conjunctivitis of left eye    Anxiety    Arm weakness    Bilirubinuria    Blood in stool    Carpal tunnel syndrome    Cellulitis of knee    Cervical disc disease    Cervical radiculopathy, chronic    Chronic pain of both shoulders    Dependence on nicotine from cigarettes    Constipation    Degenerative cervical spinal stenosis    Diabetic neuropathy (Multi)    Erectile dysfunction    Fatigue    Gallstones    GERD (gastroesophageal reflux disease)    Benign neoplasm of larynx    HTN (hypertension)    Hyperestrogenism    Hyperlipidemia    Hypertriglyceridemia    Hypogonadism in male    Increased intraocular pressure    Left shoulder pain    Memory deficits    Myalgia    Nausea and vomiting    Neck pain    Neural foraminal stenosis of cervical spine    Numbness    Overweight    Paresthesia of upper extremity    Perirectal abscess    Rectal bleeding    Rectal pain    Right upper quadrant abdominal pain    Type 2 diabetes mellitus (Multi)    Vertigo    Vitamin B 12 deficiency    Vitamin D deficiency    Screen for colon cancer    Osteoarthritis    Rash        Nutrition Assessment    Food & Nutrition Related History:  He describes himself as a picky eater.   Has seen dietitians in the past.    Had been on insulin in the past and his weight was up to 400#, went off meds and lost weight, quit drinking many Diet Coke/day at that time as well. However his A1C increased the last time it was checked and he's interested in lowering it again. He quit smoking earlier this year, and now he eats sugar-free  hard candies throughout the day instead of smoking. He also explained that mood/mental health/agoraphobia can impact his motivation to exercise.   He reports delayed gastric emptying was discovered at his last EGD, has not had gastric emptying test. Doesn't have  symptoms of gastroparesis. He reports he's been enouraged to eat smaller/frequent meals.   He lives with his wife.     Food Allergies: None  Intolerance: nuts (gives him diarrhea), lactose (and cheese in larger amounts)   Appetite: Good  GI Symptoms : None  Swallowing Difficulty: No problems with swallowing  Chewing no problems chewing  Food Preparation: Patient and Partner/Spouse  Cooking Skills/Barriers: None reported  Grocery Shopping: Patient and Partner/Spouse  Food Insecurity: Did not assess  Supplements: Vitamin B12, Vitamin D, and Magnesium     Dietary Recall:   Wakes up 4-5 am. Mug of coffee. Meds with 16-32 oz water.   Meal 1: breakfast  - 2 pieces of toast with butter/jelly  - or croissant sandwich from frozen  - occasionally oatmeal  Meal 2: lunch 11:30-12 vs. 2 pm   - skips lunch often; eats a snack at 2 pm instead, such as granola bar, trail mix, nuts, or popcorn, occasionally pork rinds.   - if he eats: fast food, a triple christina burger, and onion rings. Or Golden and a burger at [employer].   Meal 3: dinner, 6 pm  - [employer] blackened chicken freddy with biscuits and brussels sprouts. He dines out about every other day  - or microwaves a TV dinner, or eats a hard salami sandwich (on sub bread, hugo bread or regular bread)  HS Snack: 8 pm  - popcorn, or applesauce with cinnamon. Takes fiber gummy at night    Beverages: coffee, Vitamin Water. Before losing weight he drank many cans (>20) of Diet Coke/day. Sweet tea, green tea. Plain water gives him heartburn.     Physical Activity: disc golf almost daily until last year, but this year has only been a few times. Agoraphobia impacts his ability to get out to exercise - he likes disc golf due  "to being in the woods.      Labs:  Lab Results   Component Value Date    HGBA1C 9.5 (A) 05/20/2024    HGBA1C 7.6 (H) 02/12/2024    HGBA1C 7.6 (H) 11/13/2023     02/12/2024    K 4.1 02/12/2024     02/12/2024    CO2 25 02/12/2024    BUN 18 02/12/2024    CREATININE 1.13 02/12/2024    CALCIUM 9.6 02/12/2024    ALBUMIN 4.4 02/12/2024    PROT 6.6 02/12/2024    BILITOT 0.4 02/12/2024    ALKPHOS 67 02/12/2024    ALT 19 02/12/2024    AST 12 02/12/2024    GLUCOSE 218 (H) 02/12/2024     Lab Results   Component Value Date    CHOL 193 02/12/2024    LDLCALC 104 (H) 02/12/2024    TRIG 231 (H) 02/12/2024    HDL 42.6 02/12/2024    LDLF - 08/17/2023      Nutrition Focused Physical Exam:    Performed/Deferred: Deferred as pt visually appears well-nourished with no signs of malnutrition    Anthropometrics:  Ht Readings from Last 1 Encounters:   07/01/24 1.727 m (5' 7.99\")     BMI Readings from Last 1 Encounters:   07/01/24 28.44 kg/m²     Wt Readings from Last 10 Encounters:   07/01/24 84.8 kg (187 lb)   06/14/24 86.4 kg (190 lb 7.6 oz)   06/11/24 88 kg (194 lb)   05/20/24 88.5 kg (195 lb)   05/20/24 88 kg (194 lb)   02/19/24 88.5 kg (195 lb)   11/20/23 87.5 kg (193 lb)   08/24/23 86.2 kg (190 lb)   05/03/23 88.1 kg (194 lb 4 oz)   04/18/23 90.4 kg (199 lb 4.7 oz)     Weight change: stable weight for >1 year  Significant weight change: No    Estimated Nutrition Needs:    Total Energy Estimated Needs (kCal): 2000 kCal   Method for Estimating Needs: REE x 1.2   Total Protein Estimated Needs (g): 85 g   Total Protein Estimated Needs (g/kg): 1 g/kg    Nutrition Diagnosis     Patient has Malnutrition Diagnosis: No        Patient has Nutrition Diagnosis: Yes Diagnosis Status (1): New  Nutrition Diagnosis 1: Altered nutrition related to laboratory values Related to (1): endocrine dysfunction As Evidenced by (1): A1C 9.5% - which has increased from prior A1C of 7.6%     Diagnosis Status (2): New Nutrition Diagnosis 2: Inconsistent " carbohydrate intake  Nutrition Diagnosis 2: Inconsistent carbohydrate intake Related to (2): lack of knowledge about amount/type of carbohydrate food to eat As Evidenced by (2): patient reports eating a variable amount of carbohydrate-containing foods at meals.       Nutrition Interventions/Recommendations   Nutrition education on the following diet topic(s): Consistent Carbohydrate, Complex Carbohydrates, Decreased Carbohydrate, Plate Method, and Timing of Meals    Nutrition counseling using the following strategy: Nutrition Counseling: Motivational Interviewing    Coordination of Care: None    Education:  Utilize the Plate Method at meals in order to balance the types and amounts of food on the plate.   - half of the plate full of non-starchy vegetables. These foods contribute very little carbohydrate to the plate, and they add fiber to the meal. Salad, carrots, bell peppers, zucchini, broccoli, cauliflower, asparagus, radishes, carrots and green beans are a few examples of these foods. They can be served cooked or raw.  Encouraged him to eat the vegetables that he enjoys, and if he can't fill a whole half plate of vegetables he should do the best that he can to eat some vegetables with meals.   - one quarter of plate including protein foods. Examples can include meat, nuts, seeds, cheese, cottage cheese, nut butter, fish, seafood, tofu, and edamame.    - one quarter of the plate including carbohydrate foods. These foods include grains, fruit, legumes, starchy vegetables, milk and yogurt. Aim to eat at least half of the daily grains as whole grains. Reviewed some carbohydrate foods that are lower in carbohydrate - fresh fruit instead of dry, corn tortillas instead of flour, and also reviewed portion sizes of other carbohydrate foods to steer him towards 45-60 gm CHO per meal.     Encouraged testing BG strategically, encouraged him to talk with his doctor about obtaining a glucometer and some test strips so that he  could test 2 hours after meals sometimes to see what type of impact the food has on his blood sugar. Suggested he might want to try unsweetened beverages such as True Lemon or water infused with fruit/cucumber/herbs or unsweetened iced tea that is sweetened with Stevia drops - encouraged him to notice the difference in his blood sugar level when consuming those beverages instead of Vitamin Water.     Encouraged eating at least 3 times per day so  he doesn't end up replacing meals with snacks that could ultimately be more carbohydrate than a meal. Gave him ideas for snacks in writing, if he wants to eat 3 smaller meals + snacks he should limit carbohydrate at the snacks - the list includes options with 15-20 grams of carbohydrate per snack.     Handouts: ADA Plate Method and ADA Snacks, blank blood sugar log    *Patient expressed understanding of the education provided and denied any additional questions/concerns.     Monitoring & Evaluation:  Monitoring & Evaluation: Estimated Energy Intake, Amount of Food - Estimated, Meal/Snack Pattern - Estimated, Estimated Carbohydrate Intake, and Physical Activity    Nutrition Goals:  Eat 3 times per day instead of skipping lunch and ending up with snacks  Use the Plate Method to guide amount/type of food at meals  Increase physical activity - get back into disc golf or other types of physical activity on a more regular basis  Begin doing some self-monitoring of blood sugars to learn more about what type of foods/beverages impact his blood sugar.   Try infused kathleen and True Lemon to replace sugar-containing beverages      Follow up Plan:   Encouraged him to call to schedule a follow up in the next 1-3 months, especially if he wants extra support with implementing the changes we talked about.     Readiness to Change : Good  Level of Understanding : Good  Anticipated Compliant : Good

## 2024-06-26 LAB
LABORATORY COMMENT REPORT: NORMAL
PATH REPORT.FINAL DX SPEC: NORMAL
PATH REPORT.GROSS SPEC: NORMAL
PATH REPORT.TOTAL CANCER: NORMAL

## 2024-07-01 ENCOUNTER — APPOINTMENT (OUTPATIENT)
Dept: NUTRITION | Facility: CLINIC | Age: 54
End: 2024-07-01
Payer: COMMERCIAL

## 2024-07-01 VITALS — WEIGHT: 187 LBS | HEIGHT: 68 IN | BODY MASS INDEX: 28.34 KG/M2

## 2024-07-01 DIAGNOSIS — E11.42 TYPE 2 DIABETES MELLITUS WITH DIABETIC POLYNEUROPATHY, WITHOUT LONG-TERM CURRENT USE OF INSULIN (MULTI): Primary | ICD-10-CM

## 2024-07-01 PROCEDURE — 97802 MEDICAL NUTRITION INDIV IN: CPT | Performed by: DIETITIAN, REGISTERED

## 2024-07-01 NOTE — PATIENT INSTRUCTIONS
Utilize the Plate Method at meals in order to balance the types and amounts of food on the plate.   - half of the plate full of non-starchy vegetables. These foods contribute very little carbohydrate to the plate, and they add fiber to the meal. Salad, carrots, bell peppers, zucchini, broccoli, cauliflower, asparagus, radishes, carrots and green beans are a few examples of these foods. They can be served cooked or raw.  Encouraged him to eat the vegetables that he enjoys, and if he can't fill a whole half plate of vegetables he should do the best that he can to eat some vegetables with meals.   - one quarter of plate including protein foods. Examples can include meat, nuts, seeds, cheese, cottage cheese, nut butter, fish, seafood, tofu, and edamame.    - one quarter of the plate including carbohydrate foods. These foods include grains, fruit, legumes, starchy vegetables, milk and yogurt. Aim to eat at least half of the daily grains as whole grains. Reviewed some carbohydrate foods that are lower in carbohydrate - fresh fruit instead of dry, corn tortillas instead of flour, and also reviewed portion sizes of other carbohydrate foods to steer him towards 45-60 gm CHO per meal.     Encouraged testing BG strategically, encouraged him to talk with his doctor about obtaining a glucometer and some test strips so that he could test 2 hours after meals sometimes to see what type of impact the food has on his blood sugar. Suggested he might want to try unsweetened beverages such as True Lemon or water infused with fruit/cucumber/herbs or unsweetened iced tea that is sweetened with Stevia drops - encouraged him to notice the difference in his blood sugar level when consuming those beverages instead of Vitamin Water.     Encouraged eating at least 3 times per day so  he doesn't end up replacing meals with snacks that could ultimately be more carbohydrate than a meal. Gave him ideas for snacks in writing, if he wants to eat 3  smaller meals + snacks he should limit carbohydrate at the snacks - the list includes options with 15-20 grams of carbohydrate per snack.

## 2024-08-04 DIAGNOSIS — K21.9 GASTROESOPHAGEAL REFLUX DISEASE, UNSPECIFIED WHETHER ESOPHAGITIS PRESENT: ICD-10-CM

## 2024-08-05 RX ORDER — OMEPRAZOLE 40 MG/1
CAPSULE, DELAYED RELEASE ORAL
Qty: 90 CAPSULE | Refills: 1 | Status: SHIPPED | OUTPATIENT
Start: 2024-08-05

## 2024-08-06 ENCOUNTER — OFFICE VISIT (OUTPATIENT)
Dept: PODIATRY | Facility: CLINIC | Age: 54
End: 2024-08-06
Payer: COMMERCIAL

## 2024-08-06 VITALS
SYSTOLIC BLOOD PRESSURE: 116 MMHG | DIASTOLIC BLOOD PRESSURE: 63 MMHG | HEIGHT: 68 IN | TEMPERATURE: 97.7 F | OXYGEN SATURATION: 96 % | WEIGHT: 192 LBS | HEART RATE: 78 BPM | BODY MASS INDEX: 29.1 KG/M2

## 2024-08-06 DIAGNOSIS — B35.1 ONYCHOMYCOSIS: ICD-10-CM

## 2024-08-06 DIAGNOSIS — E11.42 TYPE 2 DIABETES MELLITUS WITH DIABETIC POLYNEUROPATHY, WITHOUT LONG-TERM CURRENT USE OF INSULIN (MULTI): Primary | ICD-10-CM

## 2024-08-06 DIAGNOSIS — M79.675 PAIN IN TOES OF BOTH FEET: ICD-10-CM

## 2024-08-06 DIAGNOSIS — M79.674 PAIN IN TOES OF BOTH FEET: ICD-10-CM

## 2024-08-06 PROCEDURE — 11721 DEBRIDE NAIL 6 OR MORE: CPT | Performed by: PODIATRIST

## 2024-08-06 ASSESSMENT — PATIENT HEALTH QUESTIONNAIRE - PHQ9
1. LITTLE INTEREST OR PLEASURE IN DOING THINGS: NOT AT ALL
2. FEELING DOWN, DEPRESSED OR HOPELESS: NOT AT ALL
SUM OF ALL RESPONSES TO PHQ9 QUESTIONS 1 AND 2: 0

## 2024-08-06 NOTE — PROGRESS NOTES
Follow up nail care     Chief Complaint:   Chief Complaint   Patient presents with    Follow-up           HPI:  This 54 y.o. male with PMH indicated below presents complaining of elongated painful toenails that patient is unable to cut.  He states that the nails from pressure in his shoes which causes tenderness with walking.  Patient denies using any over-the-counter oral medications for dystrophic nails.  He states that he had some relief with discomfort with ambulation after last debridement.  Admits to decrease sensation to the feet b/l.  He denies any constitutional symptoms at this time.  No other pedal complaints.        PCP:  Art Blari, DO:      REVIEW OF SYSTEMS    All negative except as listed in HPI      Physical Exam:       Patient is alert and oriented x 3 in NAD    Vascular:   2/4 Palpable Dorsalis Pedis and Posterior Tibial Pulses B/L  Capillary Fill time < 3 seconds to digits 1-5 B/L  Skin temperature warm to warm tibial tuberosity to the digits B/L  Mild pedal edema.  Mild  varicosities    Neurological:   Light touch intact with significant decrease in protective sensation noted.  Proprioception noted to be intact.    Dermatological:   Skin appears xerotic with webspaces bilaterally clean dry and intact.  Nails intact bilaterally 1 through 5 are dystrophic, discolored, elongated with  debris.  No open wounds or hyperkeratotic tissue noted.    Musculoskeletal/Orthopaedic:   +5/5 muscle strength Dorsiflexion, Plantarflexion, Inversion, Eversion B/L  ROM of the 1st MTPJ is decreased without pain or crepitus b/l.  ROM of the MTJ/STJ is full without pain or crepitus b/l.  Ankle joint ROM is decreased  B/L.  Tenderness to palpation of digits 1 through 5 bilaterally.        ASSESSMENT:   1. Type 2 diabetes mellitus with diabetic polyneuropathy, without long-term current use of insulin (Multi)    2. Onychomycosis    3. Pain in toes of both feet              Plan:    - Initial Office Visit   -  Etiology and treatment options were discussed with the patient.  -Patient examined and evaluated  - Discussed with patient topical creams for dryness of skin.  - Nails 1 through 5 are sharply debrided in thickness and length without incident.  - Discussed with patient routine pedal care as well as daily pedal checks due to neuropathy.  -Prescription for custom diabetic inserts given to patient.  To be filled by outside facility.  - Patient to follow-up in 10 weeks    Sia Haynes DPM        Off note, use of vocal Dragon dictation system was used to dictate this document.  All proper spelling and grammatical errors may not have been corrected prior to final submission.

## 2024-08-09 DIAGNOSIS — E11.42 TYPE 2 DIABETES MELLITUS WITH DIABETIC POLYNEUROPATHY, WITHOUT LONG-TERM CURRENT USE OF INSULIN (MULTI): ICD-10-CM

## 2024-08-09 RX ORDER — DAPAGLIFLOZIN 10 MG/1
TABLET, FILM COATED ORAL
Qty: 90 TABLET | Refills: 0 | Status: SHIPPED | OUTPATIENT
Start: 2024-08-09

## 2024-08-19 ENCOUNTER — APPOINTMENT (OUTPATIENT)
Dept: PRIMARY CARE | Facility: CLINIC | Age: 54
End: 2024-08-19
Payer: COMMERCIAL

## 2024-08-23 DIAGNOSIS — R11.2 NAUSEA AND VOMITING, UNSPECIFIED VOMITING TYPE: Primary | ICD-10-CM

## 2024-08-26 RX ORDER — ONDANSETRON 4 MG/1
TABLET, ORALLY DISINTEGRATING ORAL
Qty: 20 TABLET | Refills: 0 | Status: SHIPPED | OUTPATIENT
Start: 2024-08-26

## 2024-10-11 ENCOUNTER — APPOINTMENT (OUTPATIENT)
Dept: PRIMARY CARE | Facility: CLINIC | Age: 54
End: 2024-10-11
Payer: COMMERCIAL

## 2024-10-14 DIAGNOSIS — M54.50 CHRONIC LOW BACK PAIN, UNSPECIFIED BACK PAIN LATERALITY, UNSPECIFIED WHETHER SCIATICA PRESENT: ICD-10-CM

## 2024-10-14 DIAGNOSIS — G89.29 CHRONIC LOW BACK PAIN, UNSPECIFIED BACK PAIN LATERALITY, UNSPECIFIED WHETHER SCIATICA PRESENT: ICD-10-CM

## 2024-10-14 RX ORDER — CELECOXIB 200 MG/1
CAPSULE ORAL
Qty: 180 CAPSULE | Refills: 0 | Status: SHIPPED | OUTPATIENT
Start: 2024-10-14

## 2024-10-15 ENCOUNTER — OFFICE VISIT (OUTPATIENT)
Dept: PODIATRY | Facility: CLINIC | Age: 54
End: 2024-10-15
Payer: COMMERCIAL

## 2024-10-15 VITALS
RESPIRATION RATE: 16 BRPM | HEIGHT: 68 IN | WEIGHT: 183 LBS | TEMPERATURE: 97.5 F | DIASTOLIC BLOOD PRESSURE: 74 MMHG | HEART RATE: 70 BPM | SYSTOLIC BLOOD PRESSURE: 123 MMHG | BODY MASS INDEX: 27.74 KG/M2 | OXYGEN SATURATION: 98 %

## 2024-10-15 DIAGNOSIS — E11.42 TYPE 2 DIABETES MELLITUS WITH DIABETIC POLYNEUROPATHY, WITHOUT LONG-TERM CURRENT USE OF INSULIN: ICD-10-CM

## 2024-10-15 DIAGNOSIS — M79.675 PAIN IN TOES OF BOTH FEET: Primary | ICD-10-CM

## 2024-10-15 DIAGNOSIS — M79.674 PAIN IN TOES OF BOTH FEET: Primary | ICD-10-CM

## 2024-10-15 DIAGNOSIS — B35.1 ONYCHOMYCOSIS: ICD-10-CM

## 2024-10-15 PROCEDURE — 11721 DEBRIDE NAIL 6 OR MORE: CPT | Performed by: PODIATRIST

## 2024-10-15 NOTE — PROGRESS NOTES
Follow up nail care     Chief Complaint:   Chief Complaint   Patient presents with    Follow-up           HPI:  This 54 y.o. male with PMH indicated below presents complaining of elongated painful toenails that patient is unable to cut.  He states that the nails from pressure in his shoes which causes tenderness with walking.  Patient denies using any over-the-counter oral medications for dystrophic nails.  He states that he had relief with discomfort with ambulation after last debridement.  Admits to decrease sensation to the feet b/l.  He denies any trauma to the feet.  He denies any constitutional symptoms at this time.  No other pedal complaints.        PCP:  Art Blair, DO:      REVIEW OF SYSTEMS    All negative except as listed in HPI      Physical Exam:       Patient is alert and oriented x 3 in NAD    Vascular:   2/4 Palpable Dorsalis Pedis and Posterior Tibial Pulses B/L  Capillary Fill time < 3 seconds to digits 1-5 B/L  Skin temperature warm to warm tibial tuberosity to the digits B/L  Mild pedal edema.  Mild  varicosities    Neurological:   Light touch intact with significant decrease in protective sensation noted.  Proprioception noted to be intact.    Dermatological:   Skin appears xerotic with webspaces bilaterally clean dry and intact.  Nails intact bilaterally 1 through 5 are dystrophic, discolored, elongated with  debris.  No open wounds or hyperkeratotic tissue noted.    Musculoskeletal/Orthopaedic:   +5/5 muscle strength Dorsiflexion, Plantarflexion, Inversion, Eversion B/L  ROM of the 1st MTPJ is decreased without pain or crepitus b/l.  ROM of the MTJ/STJ is full without pain or crepitus b/l.  Ankle joint ROM is decreased  B/L.  Tenderness to palpation of digits 1 through 5 bilaterally.        ASSESSMENT:   1. Pain in toes of both feet    2. Type 2 diabetes mellitus with diabetic polyneuropathy, without long-term current use of insulin    3. Onychomycosis        Plan:    - Etiology and  treatment options were discussed with the patient.  -Patient examined and evaluated  - Discussed with patient topical creams for dryness of skin.  - Nails 1 through 5 are sharply debrided in thickness and length without incident.  - Discussed with patient routine pedal care as well as daily pedal checks due to neuropathy.  -Patient filling diabetic shoes   - Patient to follow-up in 10 weeks    Sia Haynes DPM        Off note, use of vocal Dragon dictation system was used to dictate this document.  All proper spelling and grammatical errors may not have been corrected prior to final submission.

## 2024-10-16 ENCOUNTER — APPOINTMENT (OUTPATIENT)
Dept: DERMATOLOGY | Facility: CLINIC | Age: 54
End: 2024-10-16
Payer: COMMERCIAL

## 2024-10-25 ENCOUNTER — APPOINTMENT (OUTPATIENT)
Dept: PRIMARY CARE | Facility: CLINIC | Age: 54
End: 2024-10-25
Payer: COMMERCIAL

## 2024-10-25 VITALS
OXYGEN SATURATION: 94 % | DIASTOLIC BLOOD PRESSURE: 78 MMHG | HEART RATE: 68 BPM | HEIGHT: 68 IN | BODY MASS INDEX: 27.41 KG/M2 | WEIGHT: 180.9 LBS | SYSTOLIC BLOOD PRESSURE: 151 MMHG

## 2024-10-25 DIAGNOSIS — F32.A ANXIETY AND DEPRESSION: ICD-10-CM

## 2024-10-25 DIAGNOSIS — Z00.00 PREVENTATIVE HEALTH CARE: ICD-10-CM

## 2024-10-25 DIAGNOSIS — F17.210 CIGARETTE NICOTINE DEPENDENCE WITHOUT COMPLICATION: ICD-10-CM

## 2024-10-25 DIAGNOSIS — F41.9 ANXIETY AND DEPRESSION: ICD-10-CM

## 2024-10-25 DIAGNOSIS — Z72.0 NICOTINE USE: Primary | ICD-10-CM

## 2024-10-25 DIAGNOSIS — E11.42 TYPE 2 DIABETES MELLITUS WITH DIABETIC POLYNEUROPATHY, WITHOUT LONG-TERM CURRENT USE OF INSULIN: ICD-10-CM

## 2024-10-25 DIAGNOSIS — N52.9 ERECTILE DYSFUNCTION, UNSPECIFIED ERECTILE DYSFUNCTION TYPE: ICD-10-CM

## 2024-10-25 DIAGNOSIS — R94.31 ABNORMAL ECG: ICD-10-CM

## 2024-10-25 LAB — POC HEMOGLOBIN A1C: 7 % (ref 4.2–6.5)

## 2024-10-25 PROCEDURE — 3078F DIAST BP <80 MM HG: CPT | Performed by: FAMILY MEDICINE

## 2024-10-25 PROCEDURE — 83036 HEMOGLOBIN GLYCOSYLATED A1C: CPT | Performed by: FAMILY MEDICINE

## 2024-10-25 PROCEDURE — 3051F HG A1C>EQUAL 7.0%<8.0%: CPT | Performed by: FAMILY MEDICINE

## 2024-10-25 PROCEDURE — 99215 OFFICE O/P EST HI 40 MIN: CPT | Performed by: FAMILY MEDICINE

## 2024-10-25 PROCEDURE — 3077F SYST BP >= 140 MM HG: CPT | Performed by: FAMILY MEDICINE

## 2024-10-25 PROCEDURE — 3049F LDL-C 100-129 MG/DL: CPT | Performed by: FAMILY MEDICINE

## 2024-10-25 PROCEDURE — 93000 ELECTROCARDIOGRAM COMPLETE: CPT | Performed by: FAMILY MEDICINE

## 2024-10-25 PROCEDURE — 99406 BEHAV CHNG SMOKING 3-10 MIN: CPT | Performed by: FAMILY MEDICINE

## 2024-10-25 PROCEDURE — 3008F BODY MASS INDEX DOCD: CPT | Performed by: FAMILY MEDICINE

## 2024-10-25 RX ORDER — BUPROPION HYDROCHLORIDE 150 MG/1
TABLET ORAL
Qty: 60 TABLET | Refills: 1 | Status: SHIPPED | OUTPATIENT
Start: 2024-10-25

## 2024-10-25 RX ORDER — TADALAFIL 10 MG/1
10 TABLET ORAL DAILY
Qty: 90 TABLET | Refills: 3 | Status: SHIPPED | OUTPATIENT
Start: 2024-10-25

## 2024-10-25 RX ORDER — BLOOD-GLUCOSE SENSOR
EACH MISCELLANEOUS
Qty: 15 EACH | Refills: 3 | Status: SHIPPED | OUTPATIENT
Start: 2024-10-25

## 2024-11-05 DIAGNOSIS — E11.42 TYPE 2 DIABETES MELLITUS WITH DIABETIC POLYNEUROPATHY, WITHOUT LONG-TERM CURRENT USE OF INSULIN: ICD-10-CM

## 2024-11-05 RX ORDER — DAPAGLIFLOZIN 10 MG/1
TABLET, FILM COATED ORAL
Qty: 100 TABLET | Refills: 3 | Status: SHIPPED | OUTPATIENT
Start: 2024-11-05

## 2024-11-06 DIAGNOSIS — E11.42 TYPE 2 DIABETES MELLITUS WITH DIABETIC POLYNEUROPATHY, WITHOUT LONG-TERM CURRENT USE OF INSULIN: ICD-10-CM

## 2024-11-07 RX ORDER — BLOOD-GLUCOSE SENSOR
EACH MISCELLANEOUS
Qty: 15 EACH | Refills: 3 | Status: SHIPPED | OUTPATIENT
Start: 2024-11-07

## 2024-11-20 ENCOUNTER — APPOINTMENT (OUTPATIENT)
Dept: CARDIOLOGY | Facility: CLINIC | Age: 54
End: 2024-11-20
Payer: COMMERCIAL

## 2024-11-20 VITALS
WEIGHT: 178.7 LBS | SYSTOLIC BLOOD PRESSURE: 114 MMHG | HEIGHT: 68 IN | DIASTOLIC BLOOD PRESSURE: 64 MMHG | BODY MASS INDEX: 27.08 KG/M2 | HEART RATE: 68 BPM

## 2024-11-20 DIAGNOSIS — E78.1 HYPERTRIGLYCERIDEMIA: ICD-10-CM

## 2024-11-20 DIAGNOSIS — E78.5 HYPERLIPIDEMIA, UNSPECIFIED HYPERLIPIDEMIA TYPE: ICD-10-CM

## 2024-11-20 DIAGNOSIS — E11.42 TYPE 2 DIABETES MELLITUS WITH DIABETIC POLYNEUROPATHY, WITHOUT LONG-TERM CURRENT USE OF INSULIN: ICD-10-CM

## 2024-11-20 DIAGNOSIS — R94.31 ABNORMAL ECG: ICD-10-CM

## 2024-11-20 DIAGNOSIS — Z82.49 FAMILY HISTORY OF ISCHEMIC HEART DISEASE: ICD-10-CM

## 2024-11-20 DIAGNOSIS — I10 HYPERTENSION, UNSPECIFIED TYPE: ICD-10-CM

## 2024-11-20 DIAGNOSIS — F17.210 CIGARETTE NICOTINE DEPENDENCE WITHOUT COMPLICATION: ICD-10-CM

## 2024-11-20 DIAGNOSIS — Z76.89 ENCOUNTER TO ESTABLISH CARE WITH NEW DOCTOR: ICD-10-CM

## 2024-11-20 PROCEDURE — 3074F SYST BP LT 130 MM HG: CPT | Performed by: INTERNAL MEDICINE

## 2024-11-20 PROCEDURE — 3051F HG A1C>EQUAL 7.0%<8.0%: CPT | Performed by: INTERNAL MEDICINE

## 2024-11-20 PROCEDURE — 99204 OFFICE O/P NEW MOD 45 MIN: CPT | Performed by: INTERNAL MEDICINE

## 2024-11-20 PROCEDURE — 3078F DIAST BP <80 MM HG: CPT | Performed by: INTERNAL MEDICINE

## 2024-11-20 PROCEDURE — 3049F LDL-C 100-129 MG/DL: CPT | Performed by: INTERNAL MEDICINE

## 2024-11-20 PROCEDURE — 3008F BODY MASS INDEX DOCD: CPT | Performed by: INTERNAL MEDICINE

## 2024-11-20 RX ORDER — TRIAMCINOLONE ACETONIDE 1 MG/G
CREAM TOPICAL
COMMUNITY
Start: 2024-08-26

## 2024-11-20 NOTE — PROGRESS NOTES
CARDIOLOGY OFFICE VISIT      CHIEF COMPLAINT      HISTORY OF PRESENT ILLNESS  The patient is being seen today today because a recent abnormal EKG.  His EKG demonstrates nonspecific ST-T changes in inferolateral leads.  He denies chest discomfort or symptoms of myocardial ischemia.  He denies dyspnea on exertion.  He denies palpitations and syncope.  He did have a CT coronary calcium score of 0 about 4 years ago.  He had a nuclear stress test in 2012 which was normal.  He denies chest discomfort or symptoms of myocardial ischemia.  He denies dyspnea exertion.  He denies palpitations at syncope.  The patient continues to smoke cigarettes.  He has history of diabetes mellitus type 2 and hypertriglyceridemia.  His father does have past history of coronary disease and myocardial infarction    Lipid profile: Cholesterol 193, HDL 42, , triglycerides 231, done in February 2024    EKG: Normal sinus rhythm, nonspecific ST-T changes, inferolateral leads    Impression:  Hyperlipidemia  Diabetes Mellitus, Type II  Family history CAD   Overweight  Tobacco Abuse    Plan:  GXT, we will call patient with results     please excuse any errors in grammar or translation related to this dictation.  Voice recognition software was utilized to prepare this document.    Past Medical History  Past Medical History:   Diagnosis Date    Abdominal pain     Anxiety     Arthritis     Cholelithiasis     Constipation     COVID-19     VACCINATED    Diabetic polyneuropathy (Multi)     GERD (gastroesophageal reflux disease)     Hypertension     Joint pain     Larynx neoplasm     Malignant neoplasm of testis 01/24/2023    Muscle weakness (generalized)     Localized muscle weakness    Personal history of other diseases of the musculoskeletal system and connective tissue     History of arthritis    Personal history of other endocrine, nutritional and metabolic disease     History of diabetes mellitus    Rectal bleeding     Type 2 diabetes mellitus      Unspecified lesions of oral mucosa     Mouth lesion    Vertigo     Wears glasses        Social History  Social History     Tobacco Use    Smoking status: Former     Types: Cigarettes    Smokeless tobacco: Never   Vaping Use    Vaping status: Never Used   Substance Use Topics    Alcohol use: Never    Drug use: Yes     Types: Marijuana     Comment: MEDICAL CARD       Family History     Family History   Problem Relation Name Age of Onset    Coronary artery disease Mother      Heart disease Father      Coronary artery disease Father      Diabetes Father's Sister      Diabetes Father's Brother      Colon cancer Cousin      Lung cancer Neg Hx          Allergies:  No Known Allergies     Outpatient Medications:  Current Outpatient Medications   Medication Instructions    buPROPion XL (Wellbutrin XL) 150 mg 24 hr tablet 1 by mouth daily in the morning for the first 4 days, thereafter 2 by mouth daily.    busPIRone (Buspar) 10 mg tablet 1 by mouth 3 times daily for at least 10 to 14 days.  If desired could increase to 2 pills twice daily after that.    capsicum (Zostrix) 0.075 % topical cream Apply 2.5 mL / 1/2 teaspoon thinly to affected areas 2-3 times daily as needed.    celecoxib (CeleBREX) 200 mg capsule TAKE 1 CAPSULE BY MOUTH 2 TIMES A DAY WITH FOOD AS NEEDED FOR ARTHRITIS PAIN    cholecalciferol (Vitamin D-3) 1,250 mcg (50,000 unit) capsule 1 capsule, See admin instructions    cyanocobalamin (VITAMIN B-12) 500 mcg, Daily    dapagliflozin propanediol (Farxiga) 10 mg TAKE 1 TABLET BY MOUTH EVERY DAY    Dexcom G7 Sensor device Placed sensor on arm.  Replace every 10 days.    famotidine (PEPCID) 20 mg, oral, 2 times daily PRN    fenofibrate (Fenoglide) 120 mg tablet 1 po qd    magnesium oxide 500 mg tablet 1 po qd    omeprazole (PriLOSEC) 40 mg DR capsule TAKE 1 CAPSULE BY MOUTH IN THE MORNING. take before a meal    ondansetron ODT (Zofran-ODT) 4 mg disintegrating tablet DISSOLVE 1 TABLET IN MOUTH EVERY FOUR TO SIX  "HOURS AS NEEDED FOR NAUSEA OR VOMITING    SITagliptin phosphate (Januvia) 100 mg tablet 1 by mouth daily    syringe with needle (BD Luer-Selma Syringe) 3 mL 25 gauge x 1\" syringe See admin instructions    tadalafil (CIALIS) 10 mg, oral, Daily    triamcinolone (Kenalog) 0.1 % cream Apply twice daily to affected areas (2 weeks on, 2 weeks off), repeat until raised areas are flat. Avoid face/groin.          REVIEW OF SYSTEMS  Review of Systems   All other systems reviewed and are negative.        VITALS  Vitals:    11/20/24 1244   BP: 114/64   Pulse: 68       PHYSICAL EXAM  Vitals reviewed.   Constitutional:       Appearance: Normal and healthy appearance. Well-developed and not in distress.   Eyes:      Conjunctiva/sclera: Conjunctivae normal.      Pupils: Pupils are equal, round, and reactive to light.   Neck:      Vascular: No JVR. JVD normal.   Pulmonary:      Effort: Pulmonary effort is normal.      Breath sounds: Normal breath sounds. No wheezing. No rhonchi. No rales.   Chest:      Chest wall: Not tender to palpatation.   Cardiovascular:      PMI at left midclavicular line. Normal rate. Regular rhythm. Normal S1. Normal S2.       Murmurs: There is no murmur.      No gallop.  No click. No rub.   Pulses:     Intact distal pulses.   Edema:     Peripheral edema absent.   Abdominal:      Tenderness: There is no abdominal tenderness.   Musculoskeletal: Normal range of motion.         General: No tenderness.      Cervical back: Normal range of motion. Skin:     General: Skin is warm and dry.   Neurological:      General: No focal deficit present.      Mental Status: Alert and oriented to person, place and time.   Psychiatric:         Behavior: Behavior is cooperative.           ASSESSMENT AND PLAN  Diagnoses and all orders for this visit:  Abnormal ECG  -     Referral to Cardiology  Hypertension, unspecified type  Hyperlipidemia, unspecified hyperlipidemia type  Hypertriglyceridemia  Type 2 diabetes mellitus with " diabetic polyneuropathy, without long-term current use of insulin  BMI 27.0-27.9,adult  Cigarette nicotine dependence without complication  Encounter to establish care with new doctor  Family history of ischemic heart disease      [unfilled]

## 2024-11-22 ENCOUNTER — DOCUMENTATION (OUTPATIENT)
Dept: CARDIOLOGY | Facility: CLINIC | Age: 54
End: 2024-11-22
Payer: COMMERCIAL

## 2024-11-22 NOTE — NURSING NOTE
11/22/24  Pt. GXT needs to be rescheduled for either 11/25 or 11/26 10:30 as the appt. 11/29/24 needs to be canceled for departmental reasons.   Called pt. And LM that testing needs to be on another day. Was given dates and times and if these are not good to find another day with [facility] secretaries.  Information also forwarded to secretaries as advised pt. To call back as appt on 11/29 is being canceled.  LISA Lerma

## 2024-11-25 ENCOUNTER — HOSPITAL ENCOUNTER (OUTPATIENT)
Dept: CARDIOLOGY | Facility: CLINIC | Age: 54
Discharge: HOME | End: 2024-11-25
Payer: COMMERCIAL

## 2024-11-25 DIAGNOSIS — Z82.49 FAMILY HISTORY OF ISCHEMIC HEART DISEASE: ICD-10-CM

## 2024-11-25 DIAGNOSIS — R94.31 ABNORMAL ECG: ICD-10-CM

## 2024-11-25 DIAGNOSIS — I10 HYPERTENSION, UNSPECIFIED TYPE: ICD-10-CM

## 2024-11-25 PROCEDURE — 93016 CV STRESS TEST SUPVJ ONLY: CPT | Performed by: INTERNAL MEDICINE

## 2024-11-25 PROCEDURE — 93018 CV STRESS TEST I&R ONLY: CPT | Performed by: INTERNAL MEDICINE

## 2024-11-25 PROCEDURE — 93017 CV STRESS TEST TRACING ONLY: CPT

## 2024-11-26 ENCOUNTER — TELEPHONE (OUTPATIENT)
Dept: CARDIOLOGY | Facility: CLINIC | Age: 54
End: 2024-11-26
Payer: COMMERCIAL

## 2024-11-26 NOTE — TELEPHONE ENCOUNTER
----- Message from Nurse Maddie GÓMEZ sent at 11/25/2024  4:03 PM EST -----    ----- Message -----  From: Victoriano Mayorga MD  Sent: 11/25/2024   3:59 PM EST  To: Maddie Quevedo LPN    Graded exercise test normal  ----- Message -----  From: Interface, [facility] - Cardiology Results In  Sent: 11/25/2024   3:31 PM EST  To: Victoriano Mayorga MD

## 2024-11-29 ENCOUNTER — APPOINTMENT (OUTPATIENT)
Dept: CARDIOLOGY | Facility: CLINIC | Age: 54
End: 2024-11-29
Payer: COMMERCIAL

## 2024-12-05 ENCOUNTER — APPOINTMENT (OUTPATIENT)
Dept: PRIMARY CARE | Facility: CLINIC | Age: 54
End: 2024-12-05
Payer: COMMERCIAL

## 2024-12-05 VITALS
BODY MASS INDEX: 28.04 KG/M2 | OXYGEN SATURATION: 95 % | HEART RATE: 74 BPM | HEIGHT: 68 IN | SYSTOLIC BLOOD PRESSURE: 144 MMHG | DIASTOLIC BLOOD PRESSURE: 78 MMHG | WEIGHT: 185 LBS

## 2024-12-05 DIAGNOSIS — Z13.9 SCREENING FOR CONDITION: ICD-10-CM

## 2024-12-05 DIAGNOSIS — E66.3 OVERWEIGHT: ICD-10-CM

## 2024-12-05 DIAGNOSIS — R94.31 ABNORMAL ECG: ICD-10-CM

## 2024-12-05 DIAGNOSIS — Z72.0 NICOTINE USE: ICD-10-CM

## 2024-12-05 DIAGNOSIS — F41.9 ANXIETY AND DEPRESSION: ICD-10-CM

## 2024-12-05 DIAGNOSIS — E11.42 TYPE 2 DIABETES MELLITUS WITH DIABETIC POLYNEUROPATHY, WITHOUT LONG-TERM CURRENT USE OF INSULIN: ICD-10-CM

## 2024-12-05 DIAGNOSIS — E53.8 B12 DEFICIENCY: ICD-10-CM

## 2024-12-05 DIAGNOSIS — I10 HYPERTENSION, UNSPECIFIED TYPE: ICD-10-CM

## 2024-12-05 DIAGNOSIS — N52.9 ERECTILE DYSFUNCTION, UNSPECIFIED ERECTILE DYSFUNCTION TYPE: ICD-10-CM

## 2024-12-05 DIAGNOSIS — E55.9 VITAMIN D DEFICIENCY: ICD-10-CM

## 2024-12-05 DIAGNOSIS — F32.A ANXIETY AND DEPRESSION: ICD-10-CM

## 2024-12-05 DIAGNOSIS — E78.5 HYPERLIPIDEMIA, UNSPECIFIED HYPERLIPIDEMIA TYPE: ICD-10-CM

## 2024-12-05 DIAGNOSIS — M54.50 CHRONIC LOW BACK PAIN, UNSPECIFIED BACK PAIN LATERALITY, UNSPECIFIED WHETHER SCIATICA PRESENT: ICD-10-CM

## 2024-12-05 DIAGNOSIS — G89.29 CHRONIC LOW BACK PAIN, UNSPECIFIED BACK PAIN LATERALITY, UNSPECIFIED WHETHER SCIATICA PRESENT: ICD-10-CM

## 2024-12-05 DIAGNOSIS — M19.90 OSTEOARTHRITIS, UNSPECIFIED OSTEOARTHRITIS TYPE, UNSPECIFIED SITE: ICD-10-CM

## 2024-12-05 DIAGNOSIS — Z79.899 DRUG THERAPY: Primary | ICD-10-CM

## 2024-12-05 PROCEDURE — 99215 OFFICE O/P EST HI 40 MIN: CPT | Performed by: FAMILY MEDICINE

## 2024-12-05 PROCEDURE — G2211 COMPLEX E/M VISIT ADD ON: HCPCS | Performed by: FAMILY MEDICINE

## 2024-12-05 PROCEDURE — 3051F HG A1C>EQUAL 7.0%<8.0%: CPT | Performed by: FAMILY MEDICINE

## 2024-12-05 PROCEDURE — 3008F BODY MASS INDEX DOCD: CPT | Performed by: FAMILY MEDICINE

## 2024-12-05 PROCEDURE — 3049F LDL-C 100-129 MG/DL: CPT | Performed by: FAMILY MEDICINE

## 2024-12-05 PROCEDURE — 3078F DIAST BP <80 MM HG: CPT | Performed by: FAMILY MEDICINE

## 2024-12-05 PROCEDURE — 3077F SYST BP >= 140 MM HG: CPT | Performed by: FAMILY MEDICINE

## 2024-12-05 NOTE — PROGRESS NOTES
"Subjective   Patient ID: Maury Contreras is a 54 y.o. male who presents for 6 Week FU (Pt in today for routine 6 week FU / smoking cessation FU).    Review of Systems  Denies N/V/D/C, no HA/S/V, denies rashes/lesions, no CP/SOB. Denies fevers/chills.  All other systems were negative.     Objective   /78 (BP Location: Left arm, Patient Position: Sitting, BP Cuff Size: Adult)   Pulse 74   Ht 1.727 m (5' 8\")   Wt 83.9 kg (185 lb)   SpO2 95%   BMI 28.13 kg/m²     Physical Exam  Gen: NAD  eyes: EOMI, PERRLA  ENT: hearing grossly intact, no nasal discharge  resp: CTABL, without R/R  heart: RRR without MRG  GI: abd: S/ND/NT, BS+  lymph: no axillary, cervical, supraclavicular lymphadenopathy noted   MS: gait grossly WNL,  derm: no rashes or lesions noted  neuro: CN II-XII grossly intact  psych: A&Ox3    Assessment/Plan   Problem List Items Addressed This Visit             ICD-10-CM    Erectile dysfunction N52.9    HTN (hypertension) I10    Hyperlipidemia E78.5    Relevant Orders    Lipid Panel    Overweight E66.3    Type 2 diabetes mellitus E11.9    Relevant Orders    Hemoglobin A1C    Vitamin D deficiency E55.9    Relevant Orders    Vitamin D 25-Hydroxy,Total (for eval of Vitamin D levels)    Osteoarthritis M19.90     Other Visit Diagnoses         Codes    Drug therapy    -  Primary Z79.899    Relevant Orders    CBC and Auto Differential    Comprehensive Metabolic Panel    Magnesium    Urinalysis with Reflex Microscopic    Screening for condition     Z13.9    Relevant Orders    TSH with reflex to Free T4 if abnormal    Nicotine use     Z72.0    Anxiety and depression     F41.9, F32.A    Abnormal ECG     R94.31    Chronic low back pain, unspecified back pain laterality, unspecified whether sciatica present     M54.50, G89.29    B12 deficiency     E53.8    Relevant Orders    Vitamin B12                   Counseled patient in smoking cessation. Spent 8 minutes.      Patient Discussion/Summary      2025 will " be next Preventative visit and [place] and lab review.      ---------  Screening for colon cancer. -->>  Next colonoscopy due around April 2026.     Screen for hepatitis C was negative September 2022.     Screening for prostate cancer.  PSA 0.76, was 0.97, 1.0.  Patient does have family history of prostate cancer. -->> We will plan to check PSA annually.     Patient is due for annual flu shot.  Is up to date on coronavirus immunization, due for bivalent booster.  Is also due for shingles immunizations.  Had Tdap immunization in September 2021. -->>  Check with your pharmacy to keep up-to-date on your other immunizations.  ------    Cardiac CT calcium scoring was zero, over read was also negative.       Osteoarthritis, neck, back, shoulders, also leg cramps. On Celebrex 200 mg twice daily as well as magnesium.  Feels the magnesium has improved leg cramps.  Has also been using a topical capsaicin product which he has found helpful. -->>  Continue current treatment.        Regarding previous labs: (annual due around anywhere).      -Hyperlipidemia, HDL 43, was 34, 31, 26, 30, 32, 36, 35, goal 45 or more. , was NC, 99, N/C, 96, N/C, 116, 93, goal is 70 or less. , was 441, 598, 758, 341, 477, 168, 740. Historically as high as 13,000. Failed Crestor and gemfibrozil  (both gastrointestinal side).  Is now on fenofibrate 54 mg. -->>  We will use fenofibrate 220 mg.  Am working on getting sugar under control and we will readdress at that time.    - Vitamin D deficiency, 68, was 56, 32, 10, 10, 9, 8, 11, 20, 10, 9, 7, 16. Your goal is 75 - 100 or more.  Is on about 50,000 units twice a week.  Has the pills he got from [employer]. -->>  Continue current dosing.  We will recheck in November.    - Drug therapy, screening for conditions. -->> Recheck annual labs annually.     - B12 deficiency: Has been on B12 shots.  Not necessarily not really noticed any benefit from them. -->> Will try switching to the pills.  B12, 1000  mcg.  Take 1 daily.  Will check next time we do blood work.    [facility]: Occasional N/V, hyperemesis. Dxed from [place] with [facility]. doing well in general using ondansetron/Zofran once a week.  Will refill as needed.      History of angina, sees cardiology/Dr. Mayorga.  He send stress test normal. -->>  Follow-up as planned.     Depression with anxiety. Agoraphobia. Anxiety had been doing well on BuSpar 15 mg twice daily.  Anhedonia/depression has improved since on Wellbutrin, having more up days then down days.  Best friend passed away in July, 2024.  Failed Cymbalta, lithium.  -->> Will refill as needed.      Erectile dysfunction: Proved since Cialis increased to 10 mg from 5.  -->>  Will refill as needed.       hypertension bp borderline today.  Usually very good to excellent numbers here. -->> CCT.      Overweight bmi 28.  About same as last time.  Down a lot from a max of 390. -->> Keep up the excellent overall work.  Continue to avoid simple carbohydrates like bread, pasta, potatoes, rice, sugars etc.    Type 2 diabetes mellitus with diabetic neuropathy, A1c 7.0, 9.5, 7.6, 7.6, 8.2, 8.2, 7.3, 7.2, 6.5, 6.5, 6.4, 6.0, 6.2, 6.5, 6.5, 6.3, 6.9 (oldest). H/O A1c's in the 14s.  Failed Rybelsus (abdominal pain).  On Farxiga 10 mg plus januvia 100mg.  In particular patient notes he has cut back on fudge rounds from the [employer].  Patient also needs a new glucose monitor.  With patient's history of fluctuating sugars needs a continuous glucose monitor to help him be aware of changes in his sugar level without having to manually test.  Has been seeing nutrition. -->> Recheck A1c with annual labs.  Does have poor circulation, so will sign up for shoes as needed.    Smoking about 0.5 ppd, down from 2 packs a day.  Does feel 300 mg daily Wellbutrin is helping.  Also does realize that it has decision making at the moment that is the big deal. Had been off cigarettes for 7 till a few months ago.  May be noticing  some jitteriness, so has cut down on caffeine while still taking the Wellbutrin. -->>  Keep up the excellent work cutting back.  Of course I do advise you to completely quit.  If the jitteriness is still a problem, could try cutting back 250 mg daily of the Wellbutrin or divided doses.       Granuloma annulare, seeing dermatology.  On a topical which sounds like it is probably a steroid. -->>  Follow-up as planned.      - Will schedule follow-up in 2 months for [facility], annual lab review and smoking counseling.  - Patient will come here about a week before the appointment to get fasting annual labs including a B12.

## 2024-12-05 NOTE — PATIENT INSTRUCTIONS
Patient Discussion/Summary      2025 will be next Preventative visit and [facility] and lab review.      ---------  Screening for colon cancer. -->>  Next colonoscopy due around April 2026.     Screen for hepatitis C was negative September 2022.     Screening for prostate cancer.  PSA 0.76, was 0.97, 1.0.  Patient does have family history of prostate cancer. -->> We will plan to check PSA annually.     Patient is due for annual flu shot.  Is up to date on coronavirus immunization, due for bivalent booster.  Is also due for shingles immunizations.  Had Tdap immunization in September 2021. -->>  Check with your pharmacy to keep up-to-date on your other immunizations.  ------    Cardiac CT calcium scoring was zero, over read was also negative.       Osteoarthritis, neck, back, shoulders, also leg cramps. On Celebrex 200 mg twice daily as well as magnesium.  Feels the magnesium has improved leg cramps.  Has also been using a topical capsaicin product which he has found helpful. -->>  Continue current treatment.        Regarding previous labs: (annual due around anywhere).      -Hyperlipidemia, HDL 43, was 34, 31, 26, 30, 32, 36, 35, goal 45 or more. , was NC, 99, N/C, 96, N/C, 116, 93, goal is 70 or less. , was 441, 598, 758, 341, 477, 168, 740. Historically as high as 13,000. Failed Crestor and gemfibrozil  (both gastrointestinal side).  Is now on fenofibrate 54 mg. -->>  We will use fenofibrate 220 mg.  Am working on getting sugar under control and we will readdress at that time.    - Vitamin D deficiency, 68, was 56, 32, 10, 10, 9, 8, 11, 20, 10, 9, 7, 16. Your goal is 75 - 100 or more.  Is on about 50,000 units twice a week.  Has the pills he got from [employer]. -->>  Continue current dosing.  We will recheck in November.    - Drug therapy, screening for conditions. -->> Recheck annual labs annually.     - B12 deficiency: Has been on B12 shots.  Not necessarily not really noticed any benefit from them. -->>  Will try switching to the pills.  B12, 1000 mcg.  Take 1 daily.  Will check next time we do blood work.    [facility]: Occasional N/V, hyperemesis. Dxed from [place] with [facility]. doing well in general using ondansetron/Zofran once a week.  Will refill as needed.      History of angina, sees cardiology/Dr. Mayorga.  He send stress test normal. -->>  Follow-up as planned.     Depression with anxiety. Agoraphobia. Anxiety had been doing well on BuSpar 15 mg twice daily.  Anhedonia/depression has improved since on Wellbutrin, having more up days then down days.  Best friend passed away in July, 2024.  Failed Cymbalta, lithium.  -->> Will refill as needed.      Erectile dysfunction: Proved since Cialis increased to 10 mg from 5.  -->>  Will refill as needed.       hypertension bp borderline today.  Usually very good to excellent numbers here. -->> CCT.      Overweight bmi 28.  About same as last time.  Down a lot from a max of 390. -->> Keep up the excellent overall work.  Continue to avoid simple carbohydrates like bread, pasta, potatoes, rice, sugars etc.    Type 2 diabetes mellitus with diabetic neuropathy, A1c 7.0, 9.5, 7.6, 7.6, 8.2, 8.2, 7.3, 7.2, 6.5, 6.5, 6.4, 6.0, 6.2, 6.5, 6.5, 6.3, 6.9 (oldest). H/O A1c's in the 14s.  Failed Rybelsus (abdominal pain).  On Farxiga 10 mg plus januvia 100mg.  In particular patient notes he has cut back on fudge rounds from the [employer].  Patient also needs a new glucose monitor.  With patient's history of fluctuating sugars needs a continuous glucose monitor to help him be aware of changes in his sugar level without having to manually test.  Has been seeing nutrition. -->> Recheck A1c with annual labs.  Does have poor circulation, so will sign up for shoes as needed.    Smoking about 0.5 ppd, down from 2 packs a day.  Does feel 300 mg daily Wellbutrin is helping.  Also does realize that it has decision making at the moment that is the big deal. Had been off cigarettes for  7 till a few months ago.  May be noticing some jitteriness, so has cut down on caffeine while still taking the Wellbutrin. -->>  Keep up the excellent work cutting back.  Of course I do advise you to completely quit.  If the jitteriness is still a problem, could try cutting back 250 mg daily of the Wellbutrin or divided doses.       Granuloma annulare, seeing dermatology.  On a topical which sounds like it is probably a steroid. -->>  Follow-up as planned.      - Will schedule follow-up in 2 months for [facility], annual lab review and smoking counseling.  -Patient will come here about a week before the appointment to get fasting annual labs including a B12.

## 2024-12-12 DIAGNOSIS — F41.9 ANXIETY AND DEPRESSION: ICD-10-CM

## 2024-12-12 DIAGNOSIS — F32.A ANXIETY AND DEPRESSION: ICD-10-CM

## 2024-12-13 RX ORDER — BUSPIRONE HYDROCHLORIDE 10 MG/1
TABLET ORAL
Qty: 360 TABLET | Refills: 3 | Status: SHIPPED | OUTPATIENT
Start: 2024-12-13

## 2024-12-27 ENCOUNTER — APPOINTMENT (OUTPATIENT)
Dept: PODIATRY | Facility: CLINIC | Age: 54
End: 2024-12-27
Payer: COMMERCIAL

## 2025-01-03 DIAGNOSIS — F17.210 CIGARETTE NICOTINE DEPENDENCE WITHOUT COMPLICATION: ICD-10-CM

## 2025-01-03 DIAGNOSIS — F41.9 ANXIETY AND DEPRESSION: ICD-10-CM

## 2025-01-03 DIAGNOSIS — F32.A ANXIETY AND DEPRESSION: ICD-10-CM

## 2025-01-06 RX ORDER — BUPROPION HYDROCHLORIDE 150 MG/1
TABLET ORAL
Qty: 60 TABLET | Refills: 0 | Status: SHIPPED | OUTPATIENT
Start: 2025-01-06

## 2025-01-30 ENCOUNTER — APPOINTMENT (OUTPATIENT)
Dept: PRIMARY CARE | Facility: CLINIC | Age: 55
End: 2025-01-30
Payer: COMMERCIAL

## 2025-01-30 DIAGNOSIS — Z79.899 DRUG THERAPY: ICD-10-CM

## 2025-01-30 DIAGNOSIS — E55.9 VITAMIN D DEFICIENCY: ICD-10-CM

## 2025-01-30 DIAGNOSIS — E78.5 HYPERLIPIDEMIA, UNSPECIFIED HYPERLIPIDEMIA TYPE: ICD-10-CM

## 2025-01-30 DIAGNOSIS — E53.8 B12 DEFICIENCY: ICD-10-CM

## 2025-01-30 DIAGNOSIS — E11.42 TYPE 2 DIABETES MELLITUS WITH DIABETIC POLYNEUROPATHY, WITHOUT LONG-TERM CURRENT USE OF INSULIN: ICD-10-CM

## 2025-01-30 DIAGNOSIS — Z13.9 SCREENING FOR CONDITION: ICD-10-CM

## 2025-01-30 NOTE — PROGRESS NOTES
Subjective   Patient ID: Maury Contreras is a 55 y.o. male who presents for Labs Only (Pt in today for lab draw).    HPI     Review of Systems    Objective   There were no vitals taken for this visit.    Physical Exam    Assessment/Plan

## 2025-02-01 LAB
25(OH)D3+25(OH)D2 SERPL-MCNC: 56 NG/ML (ref 30–100)
ALBUMIN SERPL-MCNC: 4.4 G/DL (ref 3.6–5.1)
ALP SERPL-CCNC: 61 U/L (ref 35–144)
ALT SERPL-CCNC: 14 U/L (ref 9–46)
ANION GAP SERPL CALCULATED.4IONS-SCNC: 16 MMOL/L (CALC) (ref 7–17)
APPEARANCE UR: CLEAR
AST SERPL-CCNC: 13 U/L (ref 10–35)
BASOPHILS # BLD AUTO: 41 CELLS/UL (ref 0–200)
BASOPHILS NFR BLD AUTO: 0.5 %
BILIRUB SERPL-MCNC: 0.4 MG/DL (ref 0.2–1.2)
BILIRUB UR QL STRIP: NEGATIVE
BUN SERPL-MCNC: 18 MG/DL (ref 7–25)
CALCIUM SERPL-MCNC: 10.5 MG/DL (ref 8.6–10.3)
CHLORIDE SERPL-SCNC: 104 MMOL/L (ref 98–110)
CHOLEST SERPL-MCNC: 185 MG/DL
CHOLEST/HDLC SERPL: 4.3 (CALC)
CO2 SERPL-SCNC: 21 MMOL/L (ref 20–32)
COLOR UR: YELLOW
CREAT SERPL-MCNC: 1.15 MG/DL (ref 0.7–1.3)
EGFRCR SERPLBLD CKD-EPI 2021: 76 ML/MIN/1.73M2
EOSINOPHIL # BLD AUTO: 254 CELLS/UL (ref 15–500)
EOSINOPHIL NFR BLD AUTO: 3.1 %
ERYTHROCYTE [DISTWIDTH] IN BLOOD BY AUTOMATED COUNT: 13.9 % (ref 11–15)
EST. AVERAGE GLUCOSE BLD GHB EST-MCNC: 148 MG/DL
EST. AVERAGE GLUCOSE BLD GHB EST-SCNC: 8.2 MMOL/L
GLUCOSE SERPL-MCNC: 132 MG/DL (ref 65–99)
GLUCOSE UR QL STRIP: ABNORMAL
HBA1C MFR BLD: 6.8 % OF TOTAL HGB
HCT VFR BLD AUTO: 42.9 % (ref 38.5–50)
HDLC SERPL-MCNC: 43 MG/DL
HGB BLD-MCNC: 14.8 G/DL (ref 13.2–17.1)
HGB UR QL STRIP: NEGATIVE
KETONES UR QL STRIP: NEGATIVE
LDLC SERPL CALC-MCNC: 111 MG/DL (CALC)
LEUKOCYTE ESTERASE UR QL STRIP: NEGATIVE
LYMPHOCYTES # BLD AUTO: 2509 CELLS/UL (ref 850–3900)
LYMPHOCYTES NFR BLD AUTO: 30.6 %
MAGNESIUM SERPL-MCNC: 2 MG/DL (ref 1.5–2.5)
MCH RBC QN AUTO: 30.5 PG (ref 27–33)
MCHC RBC AUTO-ENTMCNC: 34.5 G/DL (ref 32–36)
MCV RBC AUTO: 88.5 FL (ref 80–100)
MONOCYTES # BLD AUTO: 443 CELLS/UL (ref 200–950)
MONOCYTES NFR BLD AUTO: 5.4 %
NEUTROPHILS # BLD AUTO: 4953 CELLS/UL (ref 1500–7800)
NEUTROPHILS NFR BLD AUTO: 60.4 %
NITRITE UR QL STRIP: NEGATIVE
NONHDLC SERPL-MCNC: 142 MG/DL (CALC)
PH UR STRIP: 6 [PH] (ref 5–8)
PLATELET # BLD AUTO: 273 THOUSAND/UL (ref 140–400)
PMV BLD REES-ECKER: 10.4 FL (ref 7.5–12.5)
POTASSIUM SERPL-SCNC: 4 MMOL/L (ref 3.5–5.3)
PROT SERPL-MCNC: 7.1 G/DL (ref 6.1–8.1)
PROT UR QL STRIP: NEGATIVE
RBC # BLD AUTO: 4.85 MILLION/UL (ref 4.2–5.8)
SODIUM SERPL-SCNC: 141 MMOL/L (ref 135–146)
SP GR UR STRIP: 1.01 (ref 1–1.03)
TRIGL SERPL-MCNC: 195 MG/DL
TSH SERPL-ACNC: 1.4 MIU/L (ref 0.4–4.5)
VIT B12 SERPL-MCNC: 691 PG/ML (ref 200–1100)
WBC # BLD AUTO: 8.2 THOUSAND/UL (ref 3.8–10.8)

## 2025-02-05 ENCOUNTER — APPOINTMENT (OUTPATIENT)
Dept: PRIMARY CARE | Facility: CLINIC | Age: 55
End: 2025-02-05
Payer: COMMERCIAL

## 2025-02-05 VITALS
HEIGHT: 68 IN | WEIGHT: 183 LBS | OXYGEN SATURATION: 96 % | BODY MASS INDEX: 27.74 KG/M2 | DIASTOLIC BLOOD PRESSURE: 76 MMHG | HEART RATE: 67 BPM | SYSTOLIC BLOOD PRESSURE: 136 MMHG

## 2025-02-05 DIAGNOSIS — Z13.9 SCREENING FOR CONDITION: ICD-10-CM

## 2025-02-05 DIAGNOSIS — E78.5 HYPERLIPIDEMIA, UNSPECIFIED HYPERLIPIDEMIA TYPE: ICD-10-CM

## 2025-02-05 DIAGNOSIS — E11.42 TYPE 2 DIABETES MELLITUS WITH DIABETIC POLYNEUROPATHY, WITHOUT LONG-TERM CURRENT USE OF INSULIN: ICD-10-CM

## 2025-02-05 DIAGNOSIS — Z00.00 ROUTINE GENERAL MEDICAL EXAMINATION AT HEALTH CARE FACILITY: Primary | ICD-10-CM

## 2025-02-05 DIAGNOSIS — E53.8 B12 DEFICIENCY: ICD-10-CM

## 2025-02-05 DIAGNOSIS — E55.9 VITAMIN D DEFICIENCY: ICD-10-CM

## 2025-02-05 DIAGNOSIS — Z79.899 DRUG THERAPY: ICD-10-CM

## 2025-02-05 DIAGNOSIS — Z12.5 SCREENING FOR PROSTATE CANCER: ICD-10-CM

## 2025-02-05 PROCEDURE — 3075F SYST BP GE 130 - 139MM HG: CPT | Performed by: FAMILY MEDICINE

## 2025-02-05 PROCEDURE — G0439 PPPS, SUBSEQ VISIT: HCPCS | Performed by: FAMILY MEDICINE

## 2025-02-05 PROCEDURE — 3008F BODY MASS INDEX DOCD: CPT | Performed by: FAMILY MEDICINE

## 2025-02-05 PROCEDURE — 99214 OFFICE O/P EST MOD 30 MIN: CPT | Performed by: FAMILY MEDICINE

## 2025-02-05 PROCEDURE — 99406 BEHAV CHNG SMOKING 3-10 MIN: CPT | Performed by: FAMILY MEDICINE

## 2025-02-05 PROCEDURE — 3078F DIAST BP <80 MM HG: CPT | Performed by: FAMILY MEDICINE

## 2025-02-05 PROCEDURE — 99396 PREV VISIT EST AGE 40-64: CPT | Performed by: FAMILY MEDICINE

## 2025-02-05 RX ORDER — FENOFIBRATE 160 MG/1
160 TABLET ORAL DAILY
Qty: 100 TABLET | Refills: 3 | Status: SHIPPED | OUTPATIENT
Start: 2025-02-05 | End: 2026-02-05

## 2025-02-05 RX ORDER — FLASH GLUCOSE SENSOR
KIT MISCELLANEOUS
Qty: 6 EACH | Refills: 3 | Status: SHIPPED | OUTPATIENT
Start: 2025-02-05

## 2025-02-05 ASSESSMENT — ENCOUNTER SYMPTOMS
OCCASIONAL FEELINGS OF UNSTEADINESS: 0
LOSS OF SENSATION IN FEET: 0
DEPRESSION: 0

## 2025-02-05 ASSESSMENT — ACTIVITIES OF DAILY LIVING (ADL)
TAKING_MEDICATION: INDEPENDENT
BATHING: INDEPENDENT
GROCERY_SHOPPING: INDEPENDENT
DOING_HOUSEWORK: INDEPENDENT
DRESSING: INDEPENDENT
MANAGING_FINANCES: INDEPENDENT

## 2025-02-05 NOTE — PATIENT INSTRUCTIONS
2025 will be next Preventative visit and [place] and lab review.      ---------  Screening for colon cancer. -->>  Next colonoscopy due around April 2026.     Screen for hepatitis C was negative September 2022.     Screening for prostate cancer.  PSA 0.76, was 0.97, 1.0.  Patient does have family history of prostate cancer. -->> We will plan to check PSA annually.  Was supposed to be done with these labs, will do within the next 3 months.     Immunization counseling: Patient is due for annual flu shot.  Is up to date on coronavirus booster.  Is also due for shingles immunizations.  Had Tdap immunization in September 2021. -->>  Check with your pharmacy to keep up-to-date on your other immunizations.    ------    Cardiac CT calcium scoring was zero, over read was also negative.     Overweight bmi 27.  Down 2 lb since last appt 2 months ago. Down a lot from a max of 390. -->> Keep up the excellent overall work.  Continue to avoid simple carbohydrates like bread, pasta, potatoes, rice, sugars etc.      Annual labs reviewed:    -Hyperlipidemia, , goal is less than 70.  Glycerides 195, was 231, 441, 598.  Historically as high as 13,000. Failed Crestor and gemfibrozil  (both gastrointestinal side).  Is now on fenofibrate 120 mg. -->>  We will increase to 160 mg fenofibrate.  Continue working on getting sugar under control.      - Type 2 diabetes mellitus with diabetic neuropathy, A1c 6.8, was 7.0, 9.5, 7.6, 7.6, 8.2, 8.2, 7.3, 7.2, 6.5, 6.5, 6.4, 6.0, 6.2, 6.5, 6.5, 6.3, 6.9 (oldest). H/O A1c's in the 14s.  Failed Rybelsus (abdominal pain).  On Farxiga 10 mg plus januvia 100mg.  In particular patient notes he has cut back on fudge rounds from the [employer].  It appears continuous glucometer not covered.  Patient also needs a new glucose monitor.  With patient's history of fluctuating sugars needs a continuous glucose monitor to help him be aware of changes in his sugar level without having to manually  test.  Has been seeing nutrition annually. -->> Recheck A1c in 3 mo.  Does have poor circulation, so will sign up for shoes as needed.    - Vitamin D deficiency, 56, was 68, 56, 32, 10, 10, 9, 8, 11, 20, 10, 9, 7, 16. Your goal is 75 - 100 or more.  Is on about 50,000 units twice a week.  Has the pills he got from [employer]. -->>  Increase to 3 times a week 50,000 unit vitamin D3.  Recheck in 3 months.      - Drug therapy, screening for conditions. -->> Recheck annual labs annually.     - B12 deficiency: Has been on B12 shots.  Not necessarily not really noticed any benefit from them. -->> Will try switching to the pills.  B12, 1000 mcg.  Take 1 daily.  Will check next time we do blood work.      H/o [facility], still gets occasional nausea/vomiting from taking meds or at other random times. Doing well in general using ondansetron/Zofran once - twice a week. Will refill as needed.    History of angina, sees cardiology/Dr. Mayorga.  He send stress test normal. -->>  Follow-up as planned.     Depression with anxiety. Agoraphobia. Anxiety had been doing well on BuSpar 15 mg twice daily.  Anhedonia/depression has improved since on Wellbutrin, having more up days than down days.  Best friend passed away in July, 2024.  Failed Cymbalta, lithium.  -->> Will refill as needed.      Erectile dysfunction: Improved since Cialis increased to 10 mg from 5.  -->>  Will refill as needed.       hypertension bp borderline today.  Usually very good to excellent numbers here. -->> CCT.      Smoking about 0.5 ppd, down from 2 packs a day.  Does feel 300 mg daily Wellbutrin is helping.  Also does realize that it has decision making at the moment that is the big deal. Had been off cigarettes for 7 months till a July 2024.  May be noticing some jitteriness, so has cut down on caffeine while still taking the Wellbutrin. -->>  Keep up the excellent work cutting back.  Of course I do advise you to completely quit.      Alisha prabhakar, seeing  dermatology.  Was a topical which sounds like it is probably a steroid.  Taking it lately or not using it lately.  Symptoms improving, possibly due to improved sugar/glycemic control. -->>  Follow-up as planned.      Osteoarthritis, neck, back, shoulders, also leg cramps. On Celebrex 200 mg twice daily as well as magnesium.  Feels the magnesium has improved leg cramps.  Has also been using a topical capsaicin product which he has found helpful. -->>  Continue current treatment.      Notes sleeping only maybe 2 hours at night.  Denies any daytime sleepiness.    - Return in about 3 months for lab review.    - Patient will come here about a week before the appointment to get 1C, vitamin D, PSA screening drawn.

## 2025-02-05 NOTE — PROGRESS NOTES
"Subjective   Patient ID: Maury Contreras is a 55 y.o. male who presents for Medicare Annual Wellness Visit Subsequent ([facility]/lab review/smoking/counseling).    Review of Systems  Denies N/V/D/C, no HA/S/V, denies rashes/lesions, no CP/SOB. Denies fevers/chills.  All other systems were negative.     Objective   /76 (BP Location: Right arm, Patient Position: Sitting)   Pulse 67   Ht 1.727 m (5' 8\")   Wt 83 kg (183 lb)   SpO2 96%   BMI 27.83 kg/m²     Physical Exam  Gen: NAD  eyes: EOMI, PERRLA  ENT: hearing grossly intact, no nasal discharge  resp: CTABL, without R/R  heart: RRR without MRG  GI: abd: S/ND/NT, BS+  lymph: no axillary, cervical, supraclavicular lymphadenopathy noted   MS: gait grossly WNL,  derm: no rashes or lesions noted  neuro: CN II-XII grossly intact  psych: A&Ox3    Assessment/Plan   Problem List Items Addressed This Visit             ICD-10-CM    Hyperlipidemia E78.5    Relevant Medications    fenofibrate (Triglide) 160 mg tablet    Other Relevant Orders    Lipid Panel    Type 2 diabetes mellitus E11.9    Relevant Medications    FreeStyle Anoop 14 Day Sensor kit    Other Relevant Orders    Hemoglobin A1C    Vitamin D deficiency E55.9    Relevant Orders    Vitamin D 25-Hydroxy,Total (for eval of Vitamin D levels)     Other Visit Diagnoses         Codes    Routine general medical examination at health care facility    -  Primary Z00.00    Relevant Orders    1 Year Follow Up In Primary Care - Wellness Exam    B12 deficiency     E53.8    Drug therapy     Z79.899    Screening for prostate cancer     Z12.5    Relevant Orders    Prostate Spec.Ag,Screen    Screening for condition     Z13.9            Counseled patient in smoking cessation. Spent 4 minutes.      Patient Discussion/Summary      2025 will be next Preventative visit and [facility] and lab review.      ---------  Screening for colon cancer. -->>  Next colonoscopy due around April 2026.     Screen for hepatitis C was negative " September 2022.     Screening for prostate cancer.  PSA 0.76, was 0.97, 1.0.  Patient does have family history of prostate cancer. -->> We will plan to check PSA annually.  Was supposed to be done with these labs, will do within the next 3 months.     Immunization counseling: Patient is due for annual flu shot.  Is up to date on coronavirus booster.  Is also due for shingles immunizations.  Had Tdap immunization in September 2021. -->>  Check with your pharmacy to keep up-to-date on your other immunizations.    ------    Cardiac CT calcium scoring was zero, over read was also negative.     Overweight bmi 27.  Down 2 lb since last appt 2 months ago. Down a lot from a max of 390. -->> Keep up the excellent overall work.  Continue to avoid simple carbohydrates like bread, pasta, potatoes, rice, sugars etc.      Annual labs reviewed:    -Hyperlipidemia, , goal is less than 70.  Glycerides 195, was 231, 441, 598.  Historically as high as 13,000. Failed Crestor and gemfibrozil  (both gastrointestinal side).  Is now on fenofibrate 120 mg. -->>  We will increase to 160 mg fenofibrate.  Continue working on getting sugar under control.      - Type 2 diabetes mellitus with diabetic neuropathy, A1c 6.8, was 7.0, 9.5, 7.6, 7.6, 8.2, 8.2, 7.3, 7.2, 6.5, 6.5, 6.4, 6.0, 6.2, 6.5, 6.5, 6.3, 6.9 (oldest). H/O A1c's in the 14s.  Failed Rybelsus (abdominal pain).  On Farxiga 10 mg plus januvia 100mg.  In particular patient notes he has cut back on fudge rounds from the [employer].  It appears continuous glucometer not covered.  Patient also needs a new glucose monitor.  With patient's history of fluctuating sugars needs a continuous glucose monitor to help him be aware of changes in his sugar level without having to manually test.  Has been seeing nutrition annually. -->> Recheck A1c in 3 mo.  Does have poor circulation, so will sign up for shoes as needed.    - Vitamin D deficiency, 56, was 68, 56, 32, 10, 10, 9,  8, 11, 20, 10, 9, 7, 16. Your goal is 75 - 100 or more.  Is on about 50,000 units twice a week.  Has the pills he got from [employer]. -->>  Increase to 3 times a week 50,000 unit vitamin D3.  Recheck in 3 months.      - Drug therapy, screening for conditions. -->> Recheck annual labs annually.     - B12 deficiency: Has been on B12 shots.  Not necessarily not really noticed any benefit from them. -->> Will try switching to the pills.  B12, 1000 mcg.  Take 1 daily.  Will check next time we do blood work.      H/o [facility], still gets occasional nausea/vomiting from taking meds or at other random times. Doing well in general using ondansetron/Zofran once - twice a week. Will refill as needed.    History of angina, sees cardiology/Dr. Mayorga.  He send stress test normal. -->>  Follow-up as planned.     Depression with anxiety. Agoraphobia. Anxiety had been doing well on BuSpar 15 mg twice daily.  Anhedonia/depression has improved since on Wellbutrin, having more up days than down days.  Best friend passed away in July, 2024.  Failed Cymbalta, lithium.  -->> Will refill as needed.      Erectile dysfunction: Improved since Cialis increased to 10 mg from 5.  -->>  Will refill as needed.       hypertension bp borderline today.  Usually very good to excellent numbers here. -->> CCT.      Smoking about 0.5 ppd, down from 2 packs a day.  Does feel 300 mg daily Wellbutrin is helping.  Also does realize that it has decision making at the moment that is the big deal. Had been off cigarettes for 7 months till a July 2024.  May be noticing some jitteriness, so has cut down on caffeine while still taking the Wellbutrin. -->>  Keep up the excellent work cutting back.  Of course I do advise you to completely quit.      Granuloma annulare, seeing dermatology.  Was a topical which sounds like it is probably a steroid.  Taking it lately or not using it lately.  Symptoms improving, possibly due to improved sugar/glycemic control. -->>   Follow-up as planned.      Osteoarthritis, neck, back, shoulders, also leg cramps. On Celebrex 200 mg twice daily as well as magnesium.  Feels the magnesium has improved leg cramps.  Has also been using a topical capsaicin product which he has found helpful. -->>  Continue current treatment.      Notes sleeping only maybe 2 hours at night.  Denies any daytime sleepiness.    - Return in about 3 months for lab review.    - Patient will come here about a week before the appointment to get 1C, vitamin D, PSA screening drawn.

## 2025-02-06 ENCOUNTER — APPOINTMENT (OUTPATIENT)
Dept: PRIMARY CARE | Facility: CLINIC | Age: 55
End: 2025-02-06
Payer: COMMERCIAL

## 2025-02-20 DIAGNOSIS — F41.9 ANXIETY AND DEPRESSION: ICD-10-CM

## 2025-02-20 DIAGNOSIS — F32.A ANXIETY AND DEPRESSION: ICD-10-CM

## 2025-02-20 DIAGNOSIS — F17.210 CIGARETTE NICOTINE DEPENDENCE WITHOUT COMPLICATION: ICD-10-CM

## 2025-02-20 RX ORDER — BUPROPION HYDROCHLORIDE 300 MG/1
TABLET ORAL
Qty: 90 TABLET | Refills: 1 | Status: SHIPPED | OUTPATIENT
Start: 2025-02-20

## 2025-03-04 ENCOUNTER — OFFICE VISIT (OUTPATIENT)
Dept: PODIATRY | Facility: CLINIC | Age: 55
End: 2025-03-04
Payer: COMMERCIAL

## 2025-03-04 VITALS
DIASTOLIC BLOOD PRESSURE: 63 MMHG | SYSTOLIC BLOOD PRESSURE: 115 MMHG | WEIGHT: 185 LBS | HEART RATE: 70 BPM | BODY MASS INDEX: 28.04 KG/M2 | OXYGEN SATURATION: 97 % | RESPIRATION RATE: 16 BRPM | TEMPERATURE: 97.3 F | HEIGHT: 68 IN

## 2025-03-04 DIAGNOSIS — E11.42 TYPE 2 DIABETES MELLITUS WITH DIABETIC POLYNEUROPATHY, WITHOUT LONG-TERM CURRENT USE OF INSULIN: ICD-10-CM

## 2025-03-04 DIAGNOSIS — M79.675 PAIN IN TOES OF BOTH FEET: Primary | ICD-10-CM

## 2025-03-04 DIAGNOSIS — B35.1 ONYCHOMYCOSIS: ICD-10-CM

## 2025-03-04 DIAGNOSIS — M79.674 PAIN IN TOES OF BOTH FEET: Primary | ICD-10-CM

## 2025-03-04 PROCEDURE — 11721 DEBRIDE NAIL 6 OR MORE: CPT | Performed by: PODIATRIST

## 2025-03-04 NOTE — PROGRESS NOTES
Follow up nail care     Chief Complaint:   Chief Complaint   Patient presents with    Nail Problem           HPI:  This 55 y.o. male with PMH indicated below presents complaining of elongated painful toenails that patient is unable to cut.  He states that the nails from pressure in his shoes which causes tenderness with walking.  Patient denies using any over-the-counter oral medications for dystrophic nails.  Patient admits to relief after last debridement.  Patient has hemoglobin A1c was 6.8.  He did not check his blood sugar this morning.  Admits to decrease sensation to the feet b/l.  He denies any trauma to the feet.  He denies any constitutional symptoms at this time.  No other pedal complaints.        PCP:  Art Blair, DO:      REVIEW OF SYSTEMS    All negative except as listed in HPI      Physical Exam:       Patient is alert and oriented x 3 in NAD    Vascular:   2/4 Palpable Dorsalis Pedis and Posterior Tibial Pulses B/L  Capillary Fill time < 3 seconds to digits 1-5 B/L  Skin temperature warm to warm tibial tuberosity to the digits B/L  Mild pedal edema.  Mild  varicosities    Neurological:   Light touch intact with significant decrease in protective sensation noted.  Proprioception noted to be intact.    Dermatological:   Skin appears xerotic with webspaces bilaterally clean dry and intact.  Nails intact bilaterally 1 through 5 are dystrophic, discolored, elongated with  debris.  No open wounds or hyperkeratotic tissue noted.    Musculoskeletal/Orthopaedic:   +5/5 muscle strength Dorsiflexion, Plantarflexion, Inversion, Eversion B/L  ROM of the 1st MTPJ is decreased without pain or crepitus b/l.  ROM of the MTJ/STJ is full without pain or crepitus b/l.  Ankle joint ROM is decreased  B/L.  Tenderness to palpation of digits 1 through 5 bilaterally.        ASSESSMENT:   1. Pain in toes of both feet        2. Type 2 diabetes mellitus with diabetic polyneuropathy, without long-term current use of  insulin        3. Onychomycosis                Plan:    - Etiology and treatment options were discussed with the patient.  -Patient examined and evaluated  - Discussed with patient topical creams for dryness of skin.  - Nails 1 through 5 are sharply debrided in thickness and length without incident.  - Discussed with patient routine pedal care as well as daily pedal checks due to neuropathy.  - Patient to follow-up in 10 weeks    Sia Haynes DPM        Off note, use of vocal Dragon dictation system was used to dictate this document.  All proper spelling and grammatical errors may not have been corrected prior to final submission.

## 2025-04-05 DIAGNOSIS — K21.9 GASTROESOPHAGEAL REFLUX DISEASE, UNSPECIFIED WHETHER ESOPHAGITIS PRESENT: ICD-10-CM

## 2025-04-07 RX ORDER — OMEPRAZOLE 40 MG/1
CAPSULE, DELAYED RELEASE ORAL
Qty: 90 CAPSULE | Refills: 3 | Status: SHIPPED | OUTPATIENT
Start: 2025-04-07

## 2025-04-29 ENCOUNTER — OFFICE VISIT (OUTPATIENT)
Dept: PODIATRY | Facility: CLINIC | Age: 55
End: 2025-04-29
Payer: COMMERCIAL

## 2025-04-29 VITALS
WEIGHT: 185 LBS | HEIGHT: 68 IN | HEART RATE: 68 BPM | OXYGEN SATURATION: 97 % | DIASTOLIC BLOOD PRESSURE: 73 MMHG | SYSTOLIC BLOOD PRESSURE: 134 MMHG | BODY MASS INDEX: 28.04 KG/M2 | TEMPERATURE: 97.7 F

## 2025-04-29 DIAGNOSIS — M79.675 PAIN IN TOES OF BOTH FEET: ICD-10-CM

## 2025-04-29 DIAGNOSIS — E11.42 TYPE 2 DIABETES MELLITUS WITH DIABETIC POLYNEUROPATHY, WITHOUT LONG-TERM CURRENT USE OF INSULIN: Primary | ICD-10-CM

## 2025-04-29 DIAGNOSIS — B35.1 ONYCHOMYCOSIS: ICD-10-CM

## 2025-04-29 DIAGNOSIS — M79.674 PAIN IN TOES OF BOTH FEET: ICD-10-CM

## 2025-04-29 PROCEDURE — 11721 DEBRIDE NAIL 6 OR MORE: CPT | Performed by: PODIATRIST

## 2025-04-29 NOTE — PROGRESS NOTES
Follow up nail care     Chief Complaint:   Chief Complaint   Patient presents with    Follow-up           HPI:  This 55 y.o. male with PMH indicated below presents complaining of elongated painful toenails that patient is unable to cut.  He states that the nails from pressure in his shoes which causes tenderness with walking.   Patient admits to relief after last debridement.  Patient did not check his sugar this morning.   Admits to decrease sensation to the feet b/l.  He denies any trauma to the feet.  He denies any constitutional symptoms at this time.  No other pedal complaints.        PCP:  Art Blair, DO:      REVIEW OF SYSTEMS    All negative except as listed in HPI      Physical Exam:       Patient is alert and oriented x 3 in NAD    Vascular:   2/4 Palpable Dorsalis Pedis and Posterior Tibial Pulses B/L  Capillary Fill time < 3 seconds to digits 1-5 B/L  Skin temperature warm to warm tibial tuberosity to the digits B/L  Mild pedal edema.  Mild  varicosities    Neurological:   Light touch intact with significant decrease in protective sensation noted.  Proprioception noted to be intact.    Dermatological:   Skin appears xerotic with webspaces bilaterally clean dry and intact.  Nails intact bilaterally 1 through 5 are dystrophic, discolored, elongated with  debris.  No open wounds or hyperkeratotic tissue noted.    Musculoskeletal/Orthopaedic:   +5/5 muscle strength Dorsiflexion, Plantarflexion, Inversion, Eversion B/L  ROM of the 1st MTPJ is decreased without pain or crepitus b/l.  ROM of the MTJ/STJ is full without pain or crepitus b/l.  Ankle joint ROM is decreased  B/L.  Tenderness to palpation of digits 1 through 5 bilaterally.        ASSESSMENT:   1. Type 2 diabetes mellitus with diabetic polyneuropathy, without long-term current use of insulin    2. Pain in toes of both feet    3. Onychomycosis                Plan:    - Etiology and treatment options were discussed with the patient.  -Patient  examined and evaluated  - Discussed with patient topical creams for dryness of skin.  - Nails 1 through 5 are sharply debrided in thickness and length without incident.  - Discussed with patient routine pedal care as well as daily pedal checks due to neuropathy.  - Patient to follow-up in 10 weeks    Sia Haynes DPM        Off note, use of vocal Dragon dictation system was used to dictate this document.  All proper spelling and grammatical errors may not have been corrected prior to final submission.

## 2025-05-06 ENCOUNTER — APPOINTMENT (OUTPATIENT)
Dept: PRIMARY CARE | Facility: CLINIC | Age: 55
End: 2025-05-06
Payer: COMMERCIAL

## 2025-05-14 ENCOUNTER — APPOINTMENT (OUTPATIENT)
Dept: PRIMARY CARE | Facility: CLINIC | Age: 55
End: 2025-05-14
Payer: COMMERCIAL

## 2025-06-06 ENCOUNTER — HOSPITAL ENCOUNTER (EMERGENCY)
Age: 55
Discharge: HOME | End: 2025-06-06
Attending: STUDENT IN AN ORGANIZED HEALTH CARE EDUCATION/TRAINING PROGRAM
Payer: MEDICARE

## 2025-06-06 ENCOUNTER — APPOINTMENT (OUTPATIENT)
Dept: GENERAL RADIOLOGY | Age: 55
End: 2025-06-06
Payer: MEDICARE

## 2025-06-06 VITALS
WEIGHT: 173 LBS | RESPIRATION RATE: 20 BRPM | DIASTOLIC BLOOD PRESSURE: 80 MMHG | TEMPERATURE: 97.5 F | SYSTOLIC BLOOD PRESSURE: 142 MMHG | OXYGEN SATURATION: 96 % | HEART RATE: 81 BPM | BODY MASS INDEX: 26.22 KG/M2 | HEIGHT: 68 IN

## 2025-06-06 DIAGNOSIS — R07.9 CHEST PAIN, UNSPECIFIED TYPE: ICD-10-CM

## 2025-06-06 DIAGNOSIS — R51.9 NONINTRACTABLE HEADACHE, UNSPECIFIED CHRONICITY PATTERN, UNSPECIFIED HEADACHE TYPE: ICD-10-CM

## 2025-06-06 DIAGNOSIS — J40 BRONCHITIS: Primary | ICD-10-CM

## 2025-06-06 LAB
ALBUMIN SERPL-MCNC: 4.5 G/DL (ref 3.5–4.6)
ALP SERPL-CCNC: 95 U/L (ref 35–104)
ALT SERPL-CCNC: 19 U/L (ref 0–41)
ANION GAP SERPL CALCULATED.3IONS-SCNC: 12 MEQ/L (ref 9–15)
AST SERPL-CCNC: 16 U/L (ref 0–40)
BASOPHILS # BLD: 0 K/UL (ref 0–0.2)
BASOPHILS NFR BLD: 0.3 %
BILIRUB SERPL-MCNC: 0.9 MG/DL (ref 0.2–0.7)
BUN SERPL-MCNC: 21 MG/DL (ref 6–20)
CALCIUM SERPL-MCNC: 9.6 MG/DL (ref 8.5–9.9)
CHLORIDE SERPL-SCNC: 100 MEQ/L (ref 95–107)
CO2 SERPL-SCNC: 26 MEQ/L (ref 20–31)
CREAT SERPL-MCNC: 0.92 MG/DL (ref 0.7–1.2)
EKG ATRIAL RATE: 75 BPM
EKG DIAGNOSIS: NORMAL
EKG P AXIS: 71 DEGREES
EKG P-R INTERVAL: 138 MS
EKG Q-T INTERVAL: 392 MS
EKG QRS DURATION: 76 MS
EKG QTC CALCULATION (BAZETT): 437 MS
EKG R AXIS: -9 DEGREES
EKG T AXIS: 17 DEGREES
EKG VENTRICULAR RATE: 75 BPM
EOSINOPHIL # BLD: 0.1 K/UL (ref 0–0.7)
EOSINOPHIL NFR BLD: 0.4 %
ERYTHROCYTE [DISTWIDTH] IN BLOOD BY AUTOMATED COUNT: 12.7 % (ref 11.5–14.5)
GLOBULIN SER CALC-MCNC: 3.4 G/DL (ref 2.3–3.5)
GLUCOSE SERPL-MCNC: 223 MG/DL (ref 70–99)
HCT VFR BLD AUTO: 45.3 % (ref 42–52)
HGB BLD-MCNC: 16.5 G/DL (ref 14–18)
INFLUENZA A BY PCR: NEGATIVE
INFLUENZA B BY PCR: NEGATIVE
LACTATE BLDV-SCNC: 1.6 MMOL/L (ref 0.5–2.2)
LIPASE SERPL-CCNC: 38 U/L (ref 12–95)
LYMPHOCYTES # BLD: 1.6 K/UL (ref 1–4.8)
LYMPHOCYTES NFR BLD: 13.4 %
MCH RBC QN AUTO: 30.7 PG (ref 27–31.3)
MCHC RBC AUTO-ENTMCNC: 36.4 % (ref 33–37)
MCV RBC AUTO: 84.2 FL (ref 79–92.2)
MONOCYTES # BLD: 0.8 K/UL (ref 0.2–0.8)
MONOCYTES NFR BLD: 7.2 %
NEUTROPHILS # BLD: 9 K/UL (ref 1.4–6.5)
NEUTS SEG NFR BLD: 78.4 %
PLATELET # BLD AUTO: 252 K/UL (ref 130–400)
POTASSIUM SERPL-SCNC: 3.9 MEQ/L (ref 3.4–4.9)
PROT SERPL-MCNC: 7.9 G/DL (ref 6.3–8)
RBC # BLD AUTO: 5.38 M/UL (ref 4.7–6.1)
SARS-COV-2 RDRP RESP QL NAA+PROBE: NOT DETECTED
SODIUM SERPL-SCNC: 138 MEQ/L (ref 135–144)
TROPONIN, HIGH SENSITIVITY: 17 NG/L (ref 0–19)
TROPONIN, HIGH SENSITIVITY: 17 NG/L (ref 0–19)
WBC # BLD AUTO: 11.5 K/UL (ref 4.8–10.8)

## 2025-06-06 PROCEDURE — 96374 THER/PROPH/DIAG INJ IV PUSH: CPT

## 2025-06-06 PROCEDURE — 87502 INFLUENZA DNA AMP PROBE: CPT

## 2025-06-06 PROCEDURE — 80053 COMPREHEN METABOLIC PANEL: CPT

## 2025-06-06 PROCEDURE — 87635 SARS-COV-2 COVID-19 AMP PRB: CPT

## 2025-06-06 PROCEDURE — 83690 ASSAY OF LIPASE: CPT

## 2025-06-06 PROCEDURE — 93005 ELECTROCARDIOGRAM TRACING: CPT | Performed by: STUDENT IN AN ORGANIZED HEALTH CARE EDUCATION/TRAINING PROGRAM

## 2025-06-06 PROCEDURE — 6370000000 HC RX 637 (ALT 250 FOR IP): Performed by: STUDENT IN AN ORGANIZED HEALTH CARE EDUCATION/TRAINING PROGRAM

## 2025-06-06 PROCEDURE — 71045 X-RAY EXAM CHEST 1 VIEW: CPT

## 2025-06-06 PROCEDURE — 84484 ASSAY OF TROPONIN QUANT: CPT

## 2025-06-06 PROCEDURE — 85025 COMPLETE CBC W/AUTO DIFF WBC: CPT

## 2025-06-06 PROCEDURE — 6360000002 HC RX W HCPCS: Performed by: STUDENT IN AN ORGANIZED HEALTH CARE EDUCATION/TRAINING PROGRAM

## 2025-06-06 PROCEDURE — 83605 ASSAY OF LACTIC ACID: CPT

## 2025-06-06 PROCEDURE — 2580000003 HC RX 258: Performed by: STUDENT IN AN ORGANIZED HEALTH CARE EDUCATION/TRAINING PROGRAM

## 2025-06-06 PROCEDURE — 99285 EMERGENCY DEPT VISIT HI MDM: CPT

## 2025-06-06 PROCEDURE — 96375 TX/PRO/DX INJ NEW DRUG ADDON: CPT

## 2025-06-06 RX ORDER — ONDANSETRON 2 MG/ML
4 INJECTION INTRAMUSCULAR; INTRAVENOUS ONCE
Status: COMPLETED | OUTPATIENT
Start: 2025-06-06 | End: 2025-06-06

## 2025-06-06 RX ORDER — DEXTROMETHORPHAN POLISTIREX 30 MG/5ML
60 SUSPENSION ORAL 2 TIMES DAILY PRN
Qty: 89 ML | Refills: 0 | Status: SHIPPED | OUTPATIENT
Start: 2025-06-06 | End: 2025-06-16

## 2025-06-06 RX ORDER — 0.9 % SODIUM CHLORIDE 0.9 %
1000 INTRAVENOUS SOLUTION INTRAVENOUS ONCE
Status: COMPLETED | OUTPATIENT
Start: 2025-06-06 | End: 2025-06-06

## 2025-06-06 RX ORDER — PROCHLORPERAZINE EDISYLATE 5 MG/ML
5 INJECTION INTRAMUSCULAR; INTRAVENOUS
Status: COMPLETED | OUTPATIENT
Start: 2025-06-06 | End: 2025-06-06

## 2025-06-06 RX ORDER — GUAIFENESIN/DEXTROMETHORPHAN 100-10MG/5
5 SYRUP ORAL ONCE
Status: COMPLETED | OUTPATIENT
Start: 2025-06-06 | End: 2025-06-06

## 2025-06-06 RX ORDER — DICYCLOMINE HYDROCHLORIDE 10 MG/1
10 CAPSULE ORAL 4 TIMES DAILY PRN
Qty: 30 CAPSULE | Refills: 0 | Status: SHIPPED | OUTPATIENT
Start: 2025-06-06

## 2025-06-06 RX ORDER — KETOROLAC TROMETHAMINE 30 MG/ML
30 INJECTION, SOLUTION INTRAMUSCULAR; INTRAVENOUS ONCE
Status: COMPLETED | OUTPATIENT
Start: 2025-06-06 | End: 2025-06-06

## 2025-06-06 RX ORDER — ONDANSETRON 4 MG/1
4 TABLET, FILM COATED ORAL EVERY 8 HOURS PRN
Qty: 20 TABLET | Refills: 0 | Status: SHIPPED | OUTPATIENT
Start: 2025-06-06

## 2025-06-06 RX ORDER — BENZONATATE 200 MG/1
200 CAPSULE ORAL 3 TIMES DAILY PRN
Qty: 30 CAPSULE | Refills: 0 | Status: SHIPPED | OUTPATIENT
Start: 2025-06-06 | End: 2025-06-16

## 2025-06-06 RX ADMIN — KETOROLAC TROMETHAMINE 30 MG: 30 INJECTION, SOLUTION INTRAMUSCULAR at 12:37

## 2025-06-06 RX ADMIN — ONDANSETRON 4 MG: 2 INJECTION, SOLUTION INTRAMUSCULAR; INTRAVENOUS at 12:35

## 2025-06-06 RX ADMIN — SODIUM CHLORIDE 1000 ML: 0.9 INJECTION, SOLUTION INTRAVENOUS at 12:36

## 2025-06-06 RX ADMIN — PROCHLORPERAZINE EDISYLATE 5 MG: 5 INJECTION INTRAMUSCULAR; INTRAVENOUS at 12:37

## 2025-06-06 RX ADMIN — GUAIFENESIN AND DEXTROMETHORPHAN 5 ML: 20; 200 SYRUP ORAL at 12:36

## 2025-06-06 ASSESSMENT — PAIN - FUNCTIONAL ASSESSMENT: PAIN_FUNCTIONAL_ASSESSMENT: 0-10

## 2025-06-06 ASSESSMENT — LIFESTYLE VARIABLES
HOW OFTEN DO YOU HAVE A DRINK CONTAINING ALCOHOL: NEVER
HOW MANY STANDARD DRINKS CONTAINING ALCOHOL DO YOU HAVE ON A TYPICAL DAY: PATIENT DOES NOT DRINK

## 2025-06-06 ASSESSMENT — PAIN DESCRIPTION - DESCRIPTORS: DESCRIPTORS: DISCOMFORT

## 2025-06-06 ASSESSMENT — PAIN DESCRIPTION - LOCATION: LOCATION: GENERALIZED

## 2025-06-06 NOTE — ED NOTES
Patient in awake in bed talking to spouse. Lights turned off to lower stimulation. Patient stated that his pain level is currently a 1 out of 10. Patient stated that the pain is located in his left eye. Patient asked if it is going to rain today. Patient stated that the feeling maybe sinus related. No distress noticed at this time

## 2025-06-06 NOTE — DISCHARGE INSTRUCTIONS
You were seen in the ER today due to right-sided headache, chest discomfort/pain, coughing and weakness and fatigue and muscle aches.  Your COVID and flu swabs are negative although you could have another virus that is causing your symptoms.  Chest x-ray thankfully did not reveal any pneumonia.  EKG did not show any signs of heart attack.  We did test your heart enzymes known as troponin, both of which were normal, this is reassuring as if you did have any concerning cardiac injury from a heart attack your troponin should be elevated.  Your electrolytes and kidney function are normal.  Your white blood cell count is normal along with you not being anemic at this time.  We also checked your liver tests which were normal.    At this time, I will prescribe you Robitussin for your cough.  Please continue with anti-inflammatory agent such as ibuprofen for your headaches.  I will prescribe you Zofran for any nausea.  For your abdominal discomfort/cramping that is leading to the vomiting I will prescribe you Bentyl.  If despite her efforts, you continue to have nausea and vomiting for the next 48-72 hours, develop green vomiting, abdominal distention, worsening abdominal pain; please come back to the ED as at that point you will need further workup.    Please note that Bentyl can help with decreasing intestinal contractions to help with the cramping.  Please do not over take Bentyl as it can cause constipation and abdominal distention and can cause sleepiness/drowsiness if taken in excess.    Tessalon Perles are to help with decreasing the cough which you may take with the Robitussin.

## 2025-06-06 NOTE — ED TRIAGE NOTES
Arrived with report of vomiting and congestion   Severe body aches   Pt states this started Wednesday morning

## 2025-06-06 NOTE — ED PROVIDER NOTES
[facility] EMERGENCY DEPARTMENT  EMERGENCY DEPARTMENT ENCOUNTER      Pt Name: Wolf Tai  MRN: [id number]  Birthdate 1970  Date of evaluation: 6/6/2025  Provider: Saul Grant MD  1:07 PM    CHIEF COMPLAINT       Chief Complaint   Patient presents with    Illness         HISTORY OF PRESENT ILLNESS    Wolf Tai is a 55 y.o. male who presents to the emergency department multiple concerns    HPI  Patient is a 55-year-old male presenting to the ED due to concern for right-sided headache, nausea and vomiting, generalized abdominal cramping, chest pain, coughing and weakness and fatigue.  Patient has a past medical history of type 2 diabetes, diffuse arthritis and GERD.  Patient endorses that his symptoms have been going on for the past approximate week.  He endorses not being able to eat or drink given abdominal cramping when he does eat.  He endorses that his vomit is orange color but no bleeding or brown emesis or green emesis.  He endorses that he gets intermittent centralized chest pressure that is nonradiating.  No diaphoresis.  He endorses diffuse muscle aches and feeling overall weak and fatigued.  He endorses a phlegm productive cough, no blood.  No runny nose or congestion.  No recent traveling or sick contacts.  He endorsed that for the past approximate week and a half he is also had a right sided headache around his right eye, pain is pressure and sharp like as if a needle is going into his right eye.  He denies any tearing from the right eye or redness to the right eye.  No drainage from the right eye.  No recent head or neck injuries.  No neck stiffness.  No rashes to his arms or legs.  He denies any vision loss in the right eye.  He does have some photosensitivity.  No weakness or numbness to either of his arms or legs.  He denies having history of previous pneumonias nor COPD or CHF.  He denies any swelling or redness or pain to either of his calfs.  Nursing Notes were reviewed.    REVIEW OF  Radiologist below, if available at the time of this note:    XR CHEST (SINGLE VIEW FRONTAL)   Final Result   No acute active cardiopulmonary process               ED BEDSIDE ULTRASOUND:   Performed by ED Physician - none    LABS:  Labs Reviewed   CBC WITH AUTO DIFFERENTIAL - Abnormal; Notable for the following components:       Result Value    WBC 11.5 (*)     Neutrophils Absolute 9.0 (*)     All other components within normal limits   COMPREHENSIVE METABOLIC PANEL - Abnormal; Notable for the following components:    Glucose 223 (*)     BUN 21 (*)     Total Bilirubin 0.9 (*)     All other components within normal limits   COVID-19, RAPID   RAPID INFLUENZA A/B ANTIGENS   TROPONIN   LACTIC ACID   LIPASE   TROPONIN       All other labs were within normal range or not returned as of this dictation.    EMERGENCY DEPARTMENT COURSE and DIFFERENTIAL DIAGNOSIS/MDM:   Vitals:    Vitals:    06/06/25 1205 06/06/25 1300 06/06/25 1400   BP: (!) 175/102 (!) 152/60 (!) 142/80   Pulse: 79 79 81   Resp: 20 18 20   Temp: 97.5 °F (36.4 °C)     TempSrc: Oral     SpO2: 95% 94% 96%   Weight: 78.5 kg (173 lb)     Height: 1.727 m (5' 8\")             Medical Decision Making  Amount and/or Complexity of Data Reviewed  Labs: ordered.  Radiology: ordered.  ECG/medicine tests: ordered.    Risk  OTC drugs.  Prescription drug management.      Patient is a 55-year-old male presenting to the ED due to concern for right-sided headache, nausea and vomiting and generalized abdominal cramping, chest pain and coughing and generalized weakness and fatigue    With initial presentation, patient was not in immediate distress.  He was afebrile.  He was not tachycardic or hypotensive.  He is not tachypneic or hypoxic.  In brief, patient endorsed that he has been ill for the past approximate week.  He endorsed having a combination of symptoms including a right-sided headache around his right eye, nausea/vomiting, generalized abdominal cramping, chest heaviness,

## 2025-06-07 ASSESSMENT — ENCOUNTER SYMPTOMS
EYE DISCHARGE: 0
WHEEZING: 0
SHORTNESS OF BREATH: 0
COUGH: 1
EYE ITCHING: 0
CHEST TIGHTNESS: 0
VOMITING: 1
ABDOMINAL DISTENTION: 0
ABDOMINAL PAIN: 1
COLOR CHANGE: 0
SINUS PAIN: 0
RHINORRHEA: 0
CONSTIPATION: 0
NAUSEA: 0
DIARRHEA: 0
EYE PAIN: 0
EYE REDNESS: 0

## 2025-06-09 LAB
EKG ATRIAL RATE: 75 BPM
EKG DIAGNOSIS: NORMAL
EKG P AXIS: 71 DEGREES
EKG P-R INTERVAL: 138 MS
EKG Q-T INTERVAL: 392 MS
EKG QRS DURATION: 76 MS
EKG QTC CALCULATION (BAZETT): 437 MS
EKG R AXIS: -9 DEGREES
EKG T AXIS: 17 DEGREES
EKG VENTRICULAR RATE: 75 BPM

## 2025-07-08 ENCOUNTER — APPOINTMENT (OUTPATIENT)
Dept: PODIATRY | Facility: CLINIC | Age: 55
End: 2025-07-08
Payer: COMMERCIAL

## 2025-09-15 ENCOUNTER — APPOINTMENT (OUTPATIENT)
Dept: PRIMARY CARE | Facility: CLINIC | Age: 55
End: 2025-09-15
Payer: COMMERCIAL

## 2025-09-22 ENCOUNTER — APPOINTMENT (OUTPATIENT)
Dept: PRIMARY CARE | Facility: CLINIC | Age: 55
End: 2025-09-22
Payer: COMMERCIAL

## 2025-11-19 ENCOUNTER — APPOINTMENT (OUTPATIENT)
Dept: CARDIOLOGY | Facility: CLINIC | Age: 55
End: 2025-11-19
Payer: COMMERCIAL

## (undated) DEVICE — GLOVE SURG SZ 7 L12IN FNGR THK94MIL TRNSLUC YEL LTX HYDRGEL

## (undated) DEVICE — GOWN,AURORA,NONREINFORCED,LARGE: Brand: MEDLINE

## (undated) DEVICE — SPONGE GZ W4XL4IN COT 12 PLY TYP VII WVN C FLD DSGN

## (undated) DEVICE — SYRINGE BLB 50CC IRRIG PLIABLE FNGR FLNG GRAD FLSK DISP

## (undated) DEVICE — Z DISCONTINUED USE 2275686 GLOVE SURG SZ 8 L12IN FNGR THK13MIL WHT ISOLEX POLYISOPRENE

## (undated) DEVICE — HAND II: Brand: MEDLINE INDUSTRIES, INC.

## (undated) DEVICE — STERILE POLYISOPRENE POWDER-FREE SURGICAL GLOVES: Brand: PROTEXIS

## (undated) DEVICE — SUTURE ETHLN SZ 4-0 L18IN NONABSORBABLE BLK L19MM PS-2 3/8 1667H

## (undated) DEVICE — BANDAGE COMPR W3INXL5YD HI E BGE W/ CLP SURE-WRAP

## (undated) DEVICE — ZIMMER® STERILE DISPOSABLE TOURNIQUET CUFF, DUAL PORT, SINGLE BLADDER, 18 IN. (46 CM)

## (undated) DEVICE — STRETCH BANDAGE ROLL: Brand: DERMACEA

## (undated) DEVICE — PADDING CAST W4INXL4YD HIGHLY ABSRB THAN COT EZ APPL

## (undated) DEVICE — CHLORAPREP 26ML ORANGE

## (undated) DEVICE — 1010 S-DRAPE TOWEL DRAPE 10/BX: Brand: STERI-DRAPE™

## (undated) DEVICE — DRESSING GZ W1XL8IN COT XRFRM N ADH OVERWRAP CURAD